# Patient Record
Sex: MALE | Race: WHITE | Employment: OTHER | URBAN - METROPOLITAN AREA
[De-identification: names, ages, dates, MRNs, and addresses within clinical notes are randomized per-mention and may not be internally consistent; named-entity substitution may affect disease eponyms.]

---

## 2022-05-19 ENCOUNTER — HOSPITAL ENCOUNTER (INPATIENT)
Age: 77
LOS: 4 days | Discharge: HOME | DRG: 377 | End: 2022-05-24
Attending: STUDENT IN AN ORGANIZED HEALTH CARE EDUCATION/TRAINING PROGRAM | Admitting: FAMILY MEDICINE
Payer: MEDICARE

## 2022-05-19 DIAGNOSIS — K92.2 LOWER GI BLEED: Primary | ICD-10-CM

## 2022-05-19 PROCEDURE — 99285 EMERGENCY DEPT VISIT HI MDM: CPT

## 2022-05-19 RX ORDER — ATORVASTATIN CALCIUM 80 MG/1
40 TABLET, FILM COATED ORAL DAILY
COMMUNITY

## 2022-05-19 NOTE — Clinical Note
Status[de-identified] INPATIENT [101]   Type of Bed: Telemetry [19]   Cardiac Monitoring Required?: Yes   Inpatient Hospitalization Certified Necessary for the Following Reasons: 3.  Patient receiving treatment that can only be provided in an inpatient setting (further clarification in H&P documentation)   Admitting Diagnosis: Upper GI bleed [432134]   Admitting Physician: Zuleika Median [[id number]]   Attending Physician: Zuleika Median [[id number]]   Estimated Length of Stay: 2 Midnights   Discharge Plan[de-identified] Home with Office Follow-up

## 2022-05-20 ENCOUNTER — APPOINTMENT (OUTPATIENT)
Dept: CT IMAGING | Age: 77
DRG: 377 | End: 2022-05-20
Attending: INTERNAL MEDICINE
Payer: MEDICARE

## 2022-05-20 PROBLEM — K92.2 UPPER GI BLEED: Status: ACTIVE | Noted: 2022-05-20

## 2022-05-20 PROBLEM — K92.2 GI BLEED: Status: ACTIVE | Noted: 2022-05-20

## 2022-05-20 LAB
ALBUMIN SERPL-MCNC: 3.1 G/DL (ref 3.5–5)
ALBUMIN/GLOB SERPL: 1 {RATIO} (ref 1.1–2.2)
ALP SERPL-CCNC: 85 U/L (ref 45–117)
ALT SERPL-CCNC: 27 U/L (ref 12–78)
ANION GAP SERPL CALC-SCNC: 8 MMOL/L (ref 5–15)
APTT PPP: 21.8 SEC (ref 22.1–31)
AST SERPL W P-5'-P-CCNC: 14 U/L (ref 15–37)
BASOPHILS # BLD: 0 K/UL (ref 0–0.1)
BASOPHILS # BLD: 0.1 K/UL (ref 0–0.1)
BASOPHILS NFR BLD: 0 % (ref 0–1)
BASOPHILS NFR BLD: 0 % (ref 0–1)
BILIRUB SERPL-MCNC: 0.3 MG/DL (ref 0.2–1)
BUN SERPL-MCNC: 42 MG/DL (ref 6–20)
BUN/CREAT SERPL: 38 (ref 12–20)
CA-I BLD-MCNC: 8.4 MG/DL (ref 8.5–10.1)
CHLORIDE SERPL-SCNC: 104 MMOL/L (ref 97–108)
CO2 SERPL-SCNC: 26 MMOL/L (ref 21–32)
CREAT SERPL-MCNC: 1.1 MG/DL (ref 0.7–1.3)
DIFFERENTIAL METHOD BLD: ABNORMAL
DIFFERENTIAL METHOD BLD: ABNORMAL
EOSINOPHIL # BLD: 0.1 K/UL (ref 0–0.4)
EOSINOPHIL # BLD: 0.1 K/UL (ref 0–0.4)
EOSINOPHIL NFR BLD: 1 % (ref 0–7)
EOSINOPHIL NFR BLD: 1 % (ref 0–7)
ERYTHROCYTE [DISTWIDTH] IN BLOOD BY AUTOMATED COUNT: 12.2 % (ref 11.5–14.5)
ERYTHROCYTE [DISTWIDTH] IN BLOOD BY AUTOMATED COUNT: 12.2 % (ref 11.5–14.5)
GLOBULIN SER CALC-MCNC: 3.2 G/DL (ref 2–4)
GLUCOSE SERPL-MCNC: 152 MG/DL (ref 65–100)
HCT VFR BLD AUTO: 23.7 % (ref 36.6–50.3)
HCT VFR BLD AUTO: 31.9 % (ref 36.6–50.3)
HEMOCCULT SP1 STL QL: POSITIVE
HGB BLD-MCNC: 10.6 G/DL (ref 12.1–17)
HGB BLD-MCNC: 8 G/DL (ref 12.1–17)
IMM GRANULOCYTES # BLD AUTO: 0 K/UL (ref 0–0.04)
IMM GRANULOCYTES # BLD AUTO: 0 K/UL (ref 0–0.04)
IMM GRANULOCYTES NFR BLD AUTO: 0 % (ref 0–0.5)
IMM GRANULOCYTES NFR BLD AUTO: 0 % (ref 0–0.5)
INR PPP: 1 (ref 0.9–1.1)
LYMPHOCYTES # BLD: 3 K/UL (ref 0.8–3.5)
LYMPHOCYTES # BLD: 4.2 K/UL (ref 0.8–3.5)
LYMPHOCYTES NFR BLD: 32 % (ref 12–49)
LYMPHOCYTES NFR BLD: 33 % (ref 12–49)
MCH RBC QN AUTO: 32.6 PG (ref 26–34)
MCH RBC QN AUTO: 32.8 PG (ref 26–34)
MCHC RBC AUTO-ENTMCNC: 33.2 G/DL (ref 30–36.5)
MCHC RBC AUTO-ENTMCNC: 33.8 G/DL (ref 30–36.5)
MCV RBC AUTO: 97.1 FL (ref 80–99)
MCV RBC AUTO: 98.2 FL (ref 80–99)
MONOCYTES # BLD: 0.7 K/UL (ref 0–1)
MONOCYTES # BLD: 0.8 K/UL (ref 0–1)
MONOCYTES NFR BLD: 6 % (ref 5–13)
MONOCYTES NFR BLD: 8 % (ref 5–13)
NEUTS SEG # BLD: 5.4 K/UL (ref 1.8–8)
NEUTS SEG # BLD: 7.6 K/UL (ref 1.8–8)
NEUTS SEG NFR BLD: 59 % (ref 32–75)
NEUTS SEG NFR BLD: 60 % (ref 32–75)
PLATELET # BLD AUTO: 211 K/UL (ref 150–400)
PLATELET # BLD AUTO: 273 K/UL (ref 150–400)
PMV BLD AUTO: 9.7 FL (ref 8.9–12.9)
PMV BLD AUTO: 9.9 FL (ref 8.9–12.9)
POTASSIUM SERPL-SCNC: 4.3 MMOL/L (ref 3.5–5.1)
PROT SERPL-MCNC: 6.3 G/DL (ref 6.4–8.2)
PROTHROMBIN TIME: 9.9 SEC (ref 9–11.1)
RBC # BLD AUTO: 2.44 M/UL (ref 4.1–5.7)
RBC # BLD AUTO: 3.25 M/UL (ref 4.1–5.7)
SODIUM SERPL-SCNC: 138 MMOL/L (ref 136–145)
THERAPEUTIC RANGE,PTTT: ABNORMAL SEC (ref 82–109)
TROPONIN-HIGH SENSITIVITY: 6 NG/L (ref 0–76)
WBC # BLD AUTO: 12.8 K/UL (ref 4.1–11.1)
WBC # BLD AUTO: 9.3 K/UL (ref 4.1–11.1)

## 2022-05-20 PROCEDURE — 74011000250 HC RX REV CODE- 250: Performed by: FAMILY MEDICINE

## 2022-05-20 PROCEDURE — 85730 THROMBOPLASTIN TIME PARTIAL: CPT

## 2022-05-20 PROCEDURE — C9113 INJ PANTOPRAZOLE SODIUM, VIA: HCPCS | Performed by: NURSE PRACTITIONER

## 2022-05-20 PROCEDURE — C9113 INJ PANTOPRAZOLE SODIUM, VIA: HCPCS | Performed by: STUDENT IN AN ORGANIZED HEALTH CARE EDUCATION/TRAINING PROGRAM

## 2022-05-20 PROCEDURE — 74011250636 HC RX REV CODE- 250/636: Performed by: NURSE PRACTITIONER

## 2022-05-20 PROCEDURE — 74011250636 HC RX REV CODE- 250/636: Performed by: FAMILY MEDICINE

## 2022-05-20 PROCEDURE — 85025 COMPLETE CBC W/AUTO DIFF WBC: CPT

## 2022-05-20 PROCEDURE — 86923 COMPATIBILITY TEST ELECTRIC: CPT

## 2022-05-20 PROCEDURE — 74011000636 HC RX REV CODE- 636: Performed by: FAMILY MEDICINE

## 2022-05-20 PROCEDURE — 74011000250 HC RX REV CODE- 250: Performed by: STUDENT IN AN ORGANIZED HEALTH CARE EDUCATION/TRAINING PROGRAM

## 2022-05-20 PROCEDURE — 96374 THER/PROPH/DIAG INJ IV PUSH: CPT

## 2022-05-20 PROCEDURE — C9113 INJ PANTOPRAZOLE SODIUM, VIA: HCPCS | Performed by: FAMILY MEDICINE

## 2022-05-20 PROCEDURE — 36415 COLL VENOUS BLD VENIPUNCTURE: CPT

## 2022-05-20 PROCEDURE — 86900 BLOOD TYPING SEROLOGIC ABO: CPT

## 2022-05-20 PROCEDURE — 65270000029 HC RM PRIVATE

## 2022-05-20 PROCEDURE — 74011000250 HC RX REV CODE- 250: Performed by: NURSE PRACTITIONER

## 2022-05-20 PROCEDURE — 93005 ELECTROCARDIOGRAM TRACING: CPT

## 2022-05-20 PROCEDURE — 85610 PROTHROMBIN TIME: CPT

## 2022-05-20 PROCEDURE — 80053 COMPREHEN METABOLIC PANEL: CPT

## 2022-05-20 PROCEDURE — 74011250637 HC RX REV CODE- 250/637: Performed by: STUDENT IN AN ORGANIZED HEALTH CARE EDUCATION/TRAINING PROGRAM

## 2022-05-20 PROCEDURE — 84484 ASSAY OF TROPONIN QUANT: CPT

## 2022-05-20 PROCEDURE — 82272 OCCULT BLD FECES 1-3 TESTS: CPT

## 2022-05-20 PROCEDURE — 74174 CTA ABD&PLVS W/CONTRAST: CPT

## 2022-05-20 PROCEDURE — 74011250636 HC RX REV CODE- 250/636: Performed by: STUDENT IN AN ORGANIZED HEALTH CARE EDUCATION/TRAINING PROGRAM

## 2022-05-20 RX ORDER — ONDANSETRON 4 MG/1
4 TABLET, ORALLY DISINTEGRATING ORAL
Status: DISCONTINUED | OUTPATIENT
Start: 2022-05-20 | End: 2022-05-24 | Stop reason: HOSPADM

## 2022-05-20 RX ORDER — SODIUM CHLORIDE 9 MG/ML
75 INJECTION, SOLUTION INTRAVENOUS CONTINUOUS
Status: DISCONTINUED | OUTPATIENT
Start: 2022-05-20 | End: 2022-05-24 | Stop reason: HOSPADM

## 2022-05-20 RX ORDER — SODIUM CHLORIDE 9 MG/ML
50 INJECTION, SOLUTION INTRAVENOUS CONTINUOUS
Status: DISCONTINUED | OUTPATIENT
Start: 2022-05-20 | End: 2022-05-20

## 2022-05-20 RX ORDER — SODIUM CHLORIDE 0.9 % (FLUSH) 0.9 %
5-40 SYRINGE (ML) INJECTION EVERY 8 HOURS
Status: DISCONTINUED | OUTPATIENT
Start: 2022-05-20 | End: 2022-05-24 | Stop reason: HOSPADM

## 2022-05-20 RX ORDER — ACETAMINOPHEN 650 MG/1
650 SUPPOSITORY RECTAL
Status: DISCONTINUED | OUTPATIENT
Start: 2022-05-20 | End: 2022-05-24 | Stop reason: HOSPADM

## 2022-05-20 RX ORDER — ONDANSETRON 2 MG/ML
4 INJECTION INTRAMUSCULAR; INTRAVENOUS
Status: DISCONTINUED | OUTPATIENT
Start: 2022-05-20 | End: 2022-05-24 | Stop reason: HOSPADM

## 2022-05-20 RX ORDER — ACETAMINOPHEN 325 MG/1
650 TABLET ORAL
Status: DISCONTINUED | OUTPATIENT
Start: 2022-05-20 | End: 2022-05-24 | Stop reason: HOSPADM

## 2022-05-20 RX ORDER — ACETAMINOPHEN 325 MG/1
975 TABLET ORAL
Status: COMPLETED | OUTPATIENT
Start: 2022-05-20 | End: 2022-05-20

## 2022-05-20 RX ORDER — ATORVASTATIN CALCIUM 40 MG/1
40 TABLET, FILM COATED ORAL DAILY
Status: DISCONTINUED | OUTPATIENT
Start: 2022-05-20 | End: 2022-05-24 | Stop reason: HOSPADM

## 2022-05-20 RX ORDER — SODIUM CHLORIDE 0.9 % (FLUSH) 0.9 %
5-40 SYRINGE (ML) INJECTION AS NEEDED
Status: DISCONTINUED | OUTPATIENT
Start: 2022-05-20 | End: 2022-05-24 | Stop reason: HOSPADM

## 2022-05-20 RX ORDER — POLYETHYLENE GLYCOL 3350 17 G/17G
17 POWDER, FOR SOLUTION ORAL DAILY PRN
Status: DISCONTINUED | OUTPATIENT
Start: 2022-05-20 | End: 2022-05-24 | Stop reason: HOSPADM

## 2022-05-20 RX ADMIN — ACETAMINOPHEN 975 MG: 325 TABLET ORAL at 02:06

## 2022-05-20 RX ADMIN — SODIUM CHLORIDE, PRESERVATIVE FREE 40 MG: 5 INJECTION INTRAVENOUS at 06:42

## 2022-05-20 RX ADMIN — SODIUM CHLORIDE 1000 ML: 9 INJECTION, SOLUTION INTRAVENOUS at 00:20

## 2022-05-20 RX ADMIN — SODIUM CHLORIDE, PRESERVATIVE FREE 40 MG: 5 INJECTION INTRAVENOUS at 20:00

## 2022-05-20 RX ADMIN — SODIUM CHLORIDE, PRESERVATIVE FREE 10 ML: 5 INJECTION INTRAVENOUS at 19:25

## 2022-05-20 RX ADMIN — SODIUM CHLORIDE 75 ML/HR: 9 INJECTION, SOLUTION INTRAVENOUS at 09:53

## 2022-05-20 RX ADMIN — IOPAMIDOL 100 ML: 755 INJECTION, SOLUTION INTRAVENOUS at 15:10

## 2022-05-20 RX ADMIN — SODIUM CHLORIDE, PRESERVATIVE FREE 40 MG: 5 INJECTION INTRAVENOUS at 09:54

## 2022-05-20 NOTE — H&P
History and Physical    NAME: Arslan Bauer   :  1945   MRN:  [id number]     Date/Time:  2022 8:04 AM    Patient PCP: Axel Sanchez MD  ______________________________________________________________________         Subjective:     CHIEF COMPLAINT: GI bleed      HISTORY OF PRESENT ILLNESS:       Patient is a 78y.o. year old male with a PMHx significant for COPD, hypercholesteremia, tobacco use, prior lower GI bleed 20 years ago, who presented to the ED for rectal bleeding for the past day. Patient states that around 2pm yesterday he began having dark tarry stools which transitioned to bright red stools, he had 7 episodes of bloody stools yesterday. He has associated mid epigastric pain that is worse with movement and chills Per wife, patient had a similar episode of GI bleeding 20 years ago and had part of his colon resected. Patient denies any sick contacts, new medication changes, new foods or recent travel. He denies fever, chest pain, SOB, nausea, vomiting, headache. He is a current tobacco smoker and reports smoking 3 cigarettes daily. ER workup showed initial Hgb was 10.6 which dropped down to 8.0 after six hours. Patient was seen by the ER physician and admitted for further evaluation and treatment. Past Medical History:   Diagnosis Date    Asbestos exposure     Chronic obstructive pulmonary disease (HCC)     GI bleed     High cholesterol         History reviewed. No pertinent surgical history. Social History     Tobacco Use    Smoking status: Current Every Day Smoker     Packs/day: 0.25    Smokeless tobacco: Never Used   Substance Use Topics    Alcohol use: Not Currently     Comment: 16 years without        History reviewed. No pertinent family history. No Known Allergies     Prior to Admission medications    Medication Sig Start Date End Date Taking? Authorizing Provider   atorvastatin (LIPITOR) 80 mg tablet Take 40 mg by mouth daily.    Yes Laura, MD Axel         Current Facility-Administered Medications:     atorvastatin (LIPITOR) tablet 40 mg, 40 mg, Oral, DAILY, Nelly Johansen MD    0.9% sodium chloride infusion, 75 mL/hr, IntraVENous, CONTINUOUS, Nelly Johansen MD    acetaminophen (TYLENOL) tablet 650 mg, 650 mg, Oral, Q6H PRN **OR** acetaminophen (TYLENOL) suppository 650 mg, 650 mg, Rectal, Q6H PRN, Nelly Johansen MD    polyethylene glycol (MIRALAX) packet 17 g, 17 g, Oral, DAILY PRN, Nelly Johansen MD    ondansetron (ZOFRAN ODT) tablet 4 mg, 4 mg, Oral, Q8H PRN **OR** ondansetron (ZOFRAN) injection 4 mg, 4 mg, IntraVENous, Q6H PRN, Jena Johansen MD    pantoprazole (PROTONIX) 40 mg in 0.9% sodium chloride 10 mL injection, 40 mg, IntraVENous, DAILY, Jnea Johansen MD    Current Outpatient Medications:     atorvastatin (LIPITOR) 80 mg tablet, Take 40 mg by mouth daily. , Disp: , Rfl:     LAB DATA REVIEWED:    Recent Results (from the past 24 hour(s))   CBC WITH AUTOMATED DIFF    Collection Time: 05/20/22 12:15 AM   Result Value Ref Range    WBC 12.8 (H) 4.1 - 11.1 K/uL    RBC 3.25 (L) 4.10 - 5.70 M/uL    HGB 10.6 (L) 12.1 - 17.0 g/dL    HCT 31.9 (L) 36.6 - 50.3 %    MCV 98.2 80.0 - 99.0 FL    MCH 32.6 26.0 - 34.0 PG    MCHC 33.2 30.0 - 36.5 g/dL    RDW 12.2 11.5 - 14.5 %    PLATELET 710 425 - 234 K/uL    MPV 9.7 8.9 - 12.9 FL    NEUTROPHILS 60 32 - 75 %    LYMPHOCYTES 33 12 - 49 %    MONOCYTES 6 5 - 13 %    EOSINOPHILS 1 0 - 7 %    BASOPHILS 0 0 - 1 %    IMMATURE GRANULOCYTES 0 0.0 - 0.5 %    ABS. NEUTROPHILS 7.6 1.8 - 8.0 K/UL    ABS. LYMPHOCYTES 4.2 (H) 0.8 - 3.5 K/UL    ABS. MONOCYTES 0.8 0.0 - 1.0 K/UL    ABS. EOSINOPHILS 0.1 0.0 - 0.4 K/UL    ABS. BASOPHILS 0.1 0.0 - 0.1 K/UL    ABS. IMM.  GRANS. 0.0 0.00 - 0.04 K/UL    DF AUTOMATED     METABOLIC PANEL, COMPREHENSIVE    Collection Time: 05/20/22 12:15 AM   Result Value Ref Range    Sodium 138 136 - 145 mmol/L    Potassium 4.3 3.5 - 5.1 mmol/L    Chloride 104 97 - 108 mmol/L    CO2 26 21 - 32 mmol/L Anion gap 8 5 - 15 mmol/L    Glucose 152 (H) 65 - 100 mg/dL    BUN 42 (H) 6 - 20 mg/dL    Creatinine 1.10 0.70 - 1.30 mg/dL    BUN/Creatinine ratio 38 (H) 12 - 20      GFR est AA >60 >60 ml/min/1.73m2    GFR est non-AA >60 >60 ml/min/1.73m2    Calcium 8.4 (L) 8.5 - 10.1 mg/dL    Bilirubin, total 0.3 0.2 - 1.0 mg/dL    AST (SGOT) 14 (L) 15 - 37 U/L    ALT (SGPT) 27 12 - 78 U/L    Alk. phosphatase 85 45 - 117 U/L    Protein, total 6.3 (L) 6.4 - 8.2 g/dL    Albumin 3.1 (L) 3.5 - 5.0 g/dL    Globulin 3.2 2.0 - 4.0 g/dL    A-G Ratio 1.0 (L) 1.1 - 2.2     PROTHROMBIN TIME + INR    Collection Time: 05/20/22 12:15 AM   Result Value Ref Range    Prothrombin time 9.9 9.0 - 11.1 sec    INR 1.0 0.9 - 1.1     PTT    Collection Time: 05/20/22 12:15 AM   Result Value Ref Range    aPTT 21.8 (L) 22.1 - 31.0 sec    aPTT, therapeutic range   82 - 109 sec   OCCULT BLOOD, STOOL    Collection Time: 05/20/22 12:15 AM   Result Value Ref Range    Occult Blood,day 1 Positive (A) Negative     TYPE & SCREEN    Collection Time: 05/20/22 12:15 AM   Result Value Ref Range    Crossmatch Expiration 05/23/2022,2359     ABO/Rh(D) Ben Neisha Negative     Antibody screen Negative    TROPONIN-HIGH SENSITIVITY    Collection Time: 05/20/22 12:30 AM   Result Value Ref Range    Troponin-High Sensitivity 6 0 - 76 ng/L   CBC WITH AUTOMATED DIFF    Collection Time: 05/20/22  6:04 AM   Result Value Ref Range    WBC 9.3 4.1 - 11.1 K/uL    RBC 2.44 (L) 4.10 - 5.70 M/uL    HGB 8.0 (L) 12.1 - 17.0 g/dL    HCT 23.7 (L) 36.6 - 50.3 %    MCV 97.1 80.0 - 99.0 FL    MCH 32.8 26.0 - 34.0 PG    MCHC 33.8 30.0 - 36.5 g/dL    RDW 12.2 11.5 - 14.5 %    PLATELET 076 513 - 985 K/uL    MPV 9.9 8.9 - 12.9 FL    NEUTROPHILS 59 32 - 75 %    LYMPHOCYTES 32 12 - 49 %    MONOCYTES 8 5 - 13 %    EOSINOPHILS 1 0 - 7 %    BASOPHILS 0 0 - 1 %    IMMATURE GRANULOCYTES 0 0.0 - 0.5 %    ABS. NEUTROPHILS 5.4 1.8 - 8.0 K/UL    ABS. LYMPHOCYTES 3.0 0.8 - 3.5 K/UL    ABS.  MONOCYTES 0.7 0.0 - 1.0 K/UL ABS. EOSINOPHILS 0.1 0.0 - 0.4 K/UL    ABS. BASOPHILS 0.0 0.0 - 0.1 K/UL    ABS. IMM. GRANS. 0.0 0.00 - 0.04 K/UL    DF AUTOMATED         XR Results (most recent):  No results found for this or any previous visit. No orders to display        Review of Systems:  Constitutional: +chills Negative for fever. HENT: Negative. Eyes: Negative. Respiratory: Negative. Cardiovascular: Negative. Gastrointestinal: +abdominal pain, +melena, +hematochezia Negative for nausea, vomiting  Skin: Negative. Neurological: Negative. Objective:   VITALS:    Visit Vitals  BP 90/63 (BP 1 Location: Left upper arm)   Pulse 91   Temp 98.1 °F (36.7 °C)   Resp 17   Ht 5' 10\" (1.778 m)   Wt 137 lb (62.1 kg)   SpO2 98%   BMI 19.66 kg/m²       Physical Exam:   Constitutional: pt is oriented to person, place, and time. HENT:   Head: Normocephalic and atraumatic. Eyes: Pupils are equal, round, and reactive to light. EOM are normal.   Cardiovascular: Normal rate, regular rhythm and normal heart sounds. Pulmonary/Chest: Breath sounds normal. No wheezes. No rales. Exhibits no tenderness. Abdominal: Soft. Bowel sounds are normal. There is epigastric abdominal tenderness. There is no rebound and no guarding. Musculoskeletal: Normal range of motion. Neurological: pt is alert and oriented to person, place, and time. Alert. Normal strength. No cranial nerve deficit or sensory deficit. Displays a negative Romberg sign.         ASSESSMENT:  GI bleed  COPD  Tobacco use    PLAN:    Current Facility-Administered Medications:     atorvastatin (LIPITOR) tablet 40 mg, 40 mg, Oral, DAILY, Paulina Johansen MD    0.9% sodium chloride infusion, 75 mL/hr, IntraVENous, CONTINUOUS, Paulina Johansen MD    acetaminophen (TYLENOL) tablet 650 mg, 650 mg, Oral, Q6H PRN **OR** acetaminophen (TYLENOL) suppository 650 mg, 650 mg, Rectal, Q6H PRN, Jena Johansen MD    polyethylene glycol (MIRALAX) packet 17 g, 17 g, Oral, DAILY PRN, Aye Johansen MD    ondansetron (ZOFRAN ODT) tablet 4 mg, 4 mg, Oral, Q8H PRN **OR** ondansetron (ZOFRAN) injection 4 mg, 4 mg, IntraVENous, Q6H PRN, Aye Johansen MD    pantoprazole (PROTONIX) 40 mg in 0.9% sodium chloride 10 mL injection, 40 mg, IntraVENous, DAILY, Jena Johansen MD    Current Outpatient Medications:     atorvastatin (LIPITOR) 80 mg tablet, Take 40 mg by mouth daily. , Disp: , Rfl:       GI consult  CT abdomen/pelvis  ________________________________________________________________________    Signed: Christin Chapa

## 2022-05-20 NOTE — ED PROVIDER NOTES
EMERGENCY DEPARTMENT HISTORY AND PHYSICAL EXAM      Date: 5/19/2022  Patient Name: Dallas Rodriguez    History of Presenting Illness     Chief Complaint   Patient presents with    Rectal Bleeding       History Provided By: Patient    HPI: Dallas Rodriguez, 78 y.o. male with a past medical history significant COPD presents to the ED with cc of rectal bleeding. Patient states over the course of the day has noticed black stool which transitioned to bright red stool. Patient states he has had multiple bowel movements since this afternoon. Complaining of some mild midepigastric pain, no nausea or vomiting, no chest pain is complaining of some worsening shortness of breath on exertion. Patient states that approximately 20 years ago had episode of lower GI bleed for which she had to have part of his colon removed. Patient is not on any blood thinners at this time. There are no other complaints, changes, or physical findings at this time. PCP: Axel Sanchez MD    Current Facility-Administered Medications   Medication Dose Route Frequency Provider Last Rate Last Admin    pantoprazole (PROTONIX) 40 mg in 0.9% sodium chloride 10 mL injection  40 mg IntraVENous ONCE Caleb Loving MD         Current Outpatient Medications   Medication Sig Dispense Refill    atorvastatin (LIPITOR) 80 mg tablet Take 40 mg by mouth daily. Past History     Past Medical History:  Past Medical History:   Diagnosis Date    Asbestos exposure     Chronic obstructive pulmonary disease ([address]     GI bleed     High cholesterol        Past Surgical History:  History reviewed. No pertinent surgical history. Family History:  History reviewed. No pertinent family history.     Social History:  Social History     Tobacco Use    Smoking status: Current Every Day Smoker     Packs/day: 0.25    Smokeless tobacco: Never Used   Substance Use Topics    Alcohol use: Not Currently     Comment: 16 years without    Drug use: Yes     Types: Marijuana       Allergies:  No Known Allergies      Review of Systems     Review of Systems   Constitutional: Negative for activity change, appetite change, chills and fever. HENT: Negative for congestion, sore throat and trouble swallowing. Eyes: Negative for photophobia and visual disturbance. Respiratory: Negative for cough, chest tightness and shortness of breath. Cardiovascular: Negative for chest pain and palpitations. Gastrointestinal: Positive for blood in stool. Negative for abdominal pain and nausea. Genitourinary: Negative for dysuria and flank pain. Musculoskeletal: Negative for arthralgias and neck pain. Skin: Negative for color change and pallor. Neurological: Negative for dizziness, weakness, numbness and headaches. Physical Exam     Physical Exam  Vitals and nursing note reviewed. Constitutional:       General: He is in acute distress. Appearance: He is well-developed and normal weight. He is not ill-appearing or toxic-appearing. HENT:      Head: Normocephalic and atraumatic. Mouth/Throat:      Mouth: Mucous membranes are moist.   Eyes:      General: No scleral icterus. Extraocular Movements: Extraocular movements intact. Cardiovascular:      Rate and Rhythm: Normal rate and regular rhythm. Heart sounds: Normal heart sounds. Pulmonary:      Effort: Pulmonary effort is normal.   Abdominal:      General: Abdomen is flat. Bowel sounds are normal. There is no distension. Palpations: Abdomen is soft. Tenderness: There is abdominal tenderness in the epigastric area. There is no right CVA tenderness or left CVA tenderness. Genitourinary:     Comments: No hemorrhoids, maroon-colored stool noted  Skin:     General: Skin is warm and dry. Capillary Refill: Capillary refill takes less than 2 seconds. Neurological:      General: No focal deficit present. Mental Status: He is alert and oriented to person, place, and time. Diagnostic Study Results     Labs -     Recent Results (from the past 12 hour(s))   CBC WITH AUTOMATED DIFF    Collection Time: 05/20/22 12:15 AM   Result Value Ref Range    WBC 12.8 (H) 4.1 - 11.1 K/uL    RBC 3.25 (L) 4.10 - 5.70 M/uL    HGB 10.6 (L) 12.1 - 17.0 g/dL    HCT 31.9 (L) 36.6 - 50.3 %    MCV 98.2 80.0 - 99.0 FL    MCH 32.6 26.0 - 34.0 PG    MCHC 33.2 30.0 - 36.5 g/dL    RDW 12.2 11.5 - 14.5 %    PLATELET 696 107 - 650 K/uL    MPV 9.7 8.9 - 12.9 FL    NEUTROPHILS 60 32 - 75 %    LYMPHOCYTES 33 12 - 49 %    MONOCYTES 6 5 - 13 %    EOSINOPHILS 1 0 - 7 %    BASOPHILS 0 0 - 1 %    IMMATURE GRANULOCYTES 0 0.0 - 0.5 %    ABS. NEUTROPHILS 7.6 1.8 - 8.0 K/UL    ABS. LYMPHOCYTES 4.2 (H) 0.8 - 3.5 K/UL    ABS. MONOCYTES 0.8 0.0 - 1.0 K/UL    ABS. EOSINOPHILS 0.1 0.0 - 0.4 K/UL    ABS. BASOPHILS 0.1 0.0 - 0.1 K/UL    ABS. IMM. GRANS. 0.0 0.00 - 0.04 K/UL    DF AUTOMATED     METABOLIC PANEL, COMPREHENSIVE    Collection Time: 05/20/22 12:15 AM   Result Value Ref Range    Sodium 138 136 - 145 mmol/L    Potassium 4.3 3.5 - 5.1 mmol/L    Chloride 104 97 - 108 mmol/L    CO2 26 21 - 32 mmol/L    Anion gap 8 5 - 15 mmol/L    Glucose 152 (H) 65 - 100 mg/dL    BUN 42 (H) 6 - 20 mg/dL    Creatinine 1.10 0.70 - 1.30 mg/dL    BUN/Creatinine ratio 38 (H) 12 - 20      GFR est AA >60 >60 ml/min/1.73m2    GFR est non-AA >60 >60 ml/min/1.73m2    Calcium 8.4 (L) 8.5 - 10.1 mg/dL    Bilirubin, total 0.3 0.2 - 1.0 mg/dL    AST (SGOT) 14 (L) 15 - 37 U/L    ALT (SGPT) 27 12 - 78 U/L    Alk.  phosphatase 85 45 - 117 U/L    Protein, total 6.3 (L) 6.4 - 8.2 g/dL    Albumin 3.1 (L) 3.5 - 5.0 g/dL    Globulin 3.2 2.0 - 4.0 g/dL    A-G Ratio 1.0 (L) 1.1 - 2.2     PROTHROMBIN TIME + INR    Collection Time: 05/20/22 12:15 AM   Result Value Ref Range    Prothrombin time 9.9 9.0 - 11.1 sec    INR 1.0 0.9 - 1.1     PTT    Collection Time: 05/20/22 12:15 AM   Result Value Ref Range    aPTT 21.8 (L) 22.1 - 31.0 sec    aPTT, therapeutic range 82 - 109 sec   OCCULT BLOOD, STOOL    Collection Time: 05/20/22 12:15 AM   Result Value Ref Range    Occult Blood,day 1 Positive (A) Negative     TYPE & SCREEN    Collection Time: 05/20/22 12:15 AM   Result Value Ref Range    Crossmatch Expiration 05/23/2022,2359     ABO/Rh(D) Yesica Cota Negative     Antibody screen Negative    TROPONIN-HIGH SENSITIVITY    Collection Time: 05/20/22 12:30 AM   Result Value Ref Range    Troponin-High Sensitivity 6 0 - 76 ng/L   CBC WITH AUTOMATED DIFF    Collection Time: 05/20/22  6:04 AM   Result Value Ref Range    WBC 9.3 4.1 - 11.1 K/uL    RBC 2.44 (L) 4.10 - 5.70 M/uL    HGB 8.0 (L) 12.1 - 17.0 g/dL    HCT 23.7 (L) 36.6 - 50.3 %    MCV 97.1 80.0 - 99.0 FL    MCH 32.8 26.0 - 34.0 PG    MCHC 33.8 30.0 - 36.5 g/dL    RDW 12.2 11.5 - 14.5 %    PLATELET 247 497 - 501 K/uL    MPV 9.9 8.9 - 12.9 FL    NEUTROPHILS 59 32 - 75 %    LYMPHOCYTES 32 12 - 49 %    MONOCYTES 8 5 - 13 %    EOSINOPHILS 1 0 - 7 %    BASOPHILS 0 0 - 1 %    IMMATURE GRANULOCYTES 0 0.0 - 0.5 %    ABS. NEUTROPHILS 5.4 1.8 - 8.0 K/UL    ABS. LYMPHOCYTES 3.0 0.8 - 3.5 K/UL    ABS. MONOCYTES 0.7 0.0 - 1.0 K/UL    ABS. EOSINOPHILS 0.1 0.0 - 0.4 K/UL    ABS. BASOPHILS 0.0 0.0 - 0.1 K/UL    ABS. IMM. GRANS. 0.0 0.00 - 0.04 K/UL    DF AUTOMATED         Radiologic Studies -   [unfilled]  CT Results  (Last 48 hours)    None        CXR Results  (Last 48 hours)    None          Medical Decision Making and ED Course   I am the first provider for this patient. I reviewed the vital signs, available nursing notes, past medical history, past surgical history, family history and social history. Vital Signs-Reviewed the patient's vital signs.   Patient Vitals for the past 12 hrs:   Temp Pulse Resp BP SpO2   05/20/22 0626 -- -- -- -- 98 %   05/20/22 0452 98.1 °F (36.7 °C) 91 17 90/63 98 %   05/20/22 0206 -- 92 18 (!) 93/59 97 %   05/20/22 0028 -- (!) 108 22 99/66 99 %   05/19/22 2343 97.5 °F (36.4 °C) 98 17 90/60 98 %       EKG interpretation: (Preliminary)  Normal sinus rhythm 92, no ST elevation, normal axis    Records Reviewed: Nursing Notes    The patient presents with bright red blood per rectum with a differential diagnosis of lower GI bleed brisk upper GI bleed, hemorrhoid, AVM, diverticulitis      Provider Notes (Medical Decision Making):     MDM   81-year-old male, history of COPD, distant history of lower GI bleed presents emergency department for multiple episodes of dark stool transitioning to bloody stool over the course of the day. Physical exam shows mild tachycardia 100-1 05, borderline blood pressure 90 systolic over 60, abdomen soft mild epigastric tenderness. Rectal exam shows bright red blood per rectum no active extravasation at this time. 2 large-bore IVs established, basic lab work including type and screen and coags ordered, 1 L normal saline ordered. ED Course:   Initial assessment performed. The patients presenting problems have been discussed, and they are in agreement with the care plan formulated and outlined with them. I have encouraged them to ask questions as they arise throughout their visit. ED Course as of 05/20/22 0636   Fri May 20, 2022   0132 Patient's lab work resulted, CBC shows appropriate hemoglobin 10.6 hematocrit 61.2, metabolic panel grossly normal mild hyperglycemia. 152, normal coags, blood pressure systolic 08-26, pulse rate 98-1 08. Patient received 1 L normal saline. No active extravasation at this time. Will admit patient to telemetry bed for GI bleed. At this time patient stable for freestanding however if there is any change in clinical condition will transfer patient ED to ED. [PZ]   [id number] Patient's repeat CBC shows hemoglobin dropped approximately 2 g every 6 hours. Patient has no active bleeding since has been in the ED, vitals have remained stable since he has been here soft blood pressures 90 systolic.   Given drop in hemoglobin and lack of blood products at freestanding feel patient should be transferred to [facility], will arrange for ED to ED transfer. I discussed case with Dr. Quiana Hines, accepts ED to ED transfer [PZ]   0891 Patient reassessed, states he feels about the same, still complaining of some mid epigastric pain, will order one dose protonix 40mg in case of upper bleed. [PZ]      ED Course User Index  [PZ] Alma Diaz MD         Procedures       Abundio Noriega MD  Procedures           Disposition       Admitted        Diagnosis     Clinical Impression:   1. Lower GI bleed        Attestations:    Abundio Noriega MD    Please note that this dictation was completed with [employer], the computer voice recognition software. Quite often unanticipated grammatical, syntax, homophones, and other interpretive errors are inadvertently transcribed by the computer software. Please disregard these errors. Please excuse any errors that have escaped final proofreading. Thank you.

## 2022-05-20 NOTE — ED TRIAGE NOTES
Pt reports bright red rectal bleeding that started around 1400 today. Pt also reports right upper abdominal pain rated 8/10. Pt reports he has had approx. 7 movements that started as black and has now turned red. Pt reports this has happened to him once before 20 years ago.

## 2022-05-20 NOTE — PROGRESS NOTES
Two person skin assessment performed by Ray Wilkinson RN and Ginger TRAN. Patient's skin is clean, dry and intact. No abnormalities noted at this time.

## 2022-05-20 NOTE — H&P
History and Physical    NAME: Cory Travis   :  1945   MRN:  [id number]     Date/Time:  2022 8:04 AM    Patient PCP: Axel Sanchez MD  ______________________________________________________________________         Subjective:     CHIEF COMPLAINT: GI bleed      HISTORY OF PRESENT ILLNESS:       Patient is a 78y.o. year old male with a PMHx significant for COPD, hypercholesteremia, tobacco use, prior lower GI bleed 20 years ago, who presented to the ED for rectal bleeding for the past day. Patient states that around 2pm yesterday he began having dark tarry stools which transitioned to bright red stools, he had 7 episodes of bloody stools yesterday. He has associated mid epigastric pain that is worse with movement and chills Per wife, patient had a similar episode of GI bleeding 20 years ago and had part of his colon resected. Patient denies any sick contacts, new medication changes, new foods or recent travel. He denies fever, chest pain, SOB, nausea, vomiting, headache. He is a current tobacco smoker and reports smoking 3 cigarettes daily. ER workup showed initial Hgb was 10.6 which dropped down to 8.0 after six hours. Patient was seen by the ER physician and admitted for further evaluation and treatment. Past Medical History:   Diagnosis Date    Asbestos exposure     Chronic obstructive pulmonary disease (HCC)     GI bleed     High cholesterol         History reviewed. No pertinent surgical history. Social History     Tobacco Use    Smoking status: Current Every Day Smoker     Packs/day: 0.25    Smokeless tobacco: Never Used   Substance Use Topics    Alcohol use: Not Currently     Comment: 16 years without        History reviewed. No pertinent family history. No Known Allergies     Prior to Admission medications    Medication Sig Start Date End Date Taking? Authorizing Provider   atorvastatin (LIPITOR) 80 mg tablet Take 40 mg by mouth daily.    Yes Laura, MD Axel         Current Facility-Administered Medications:     atorvastatin (LIPITOR) tablet 40 mg, 40 mg, Oral, DAILY, Sina Johansen MD    0.9% sodium chloride infusion, 75 mL/hr, IntraVENous, CONTINUOUS, Jena Johansen MD, Last Rate: 75 mL/hr at 05/20/22 0953, 75 mL/hr at 05/20/22 0953    acetaminophen (TYLENOL) tablet 650 mg, 650 mg, Oral, Q6H PRN **OR** acetaminophen (TYLENOL) suppository 650 mg, 650 mg, Rectal, Q6H PRN, Sina Johansen MD    polyethylene glycol (MIRALAX) packet 17 g, 17 g, Oral, DAILY PRN, Sina Johansen MD    ondansetron (ZOFRAN ODT) tablet 4 mg, 4 mg, Oral, Q8H PRN **OR** ondansetron (ZOFRAN) injection 4 mg, 4 mg, IntraVENous, Q6H PRN, Jena Johansen MD    pantoprazole (PROTONIX) 40 mg in 0.9% sodium chloride 10 mL injection, 40 mg, IntraVENous, Q12H, Dougherty, Barb F, NP    sodium chloride (NS) flush 5-40 mL, 5-40 mL, IntraVENous, Q8H, Dougherty, Barb F, NP    sodium chloride (NS) flush 5-40 mL, 5-40 mL, IntraVENous, PRN, Barb Vallecillo, NP    [START ON 5/22/2022] peg 3350-electrolytes (COLYTE) 4000 mL, 4,000 mL, Oral, ONCE, Dougherty, Barb F, NP    Current Outpatient Medications:     atorvastatin (LIPITOR) 80 mg tablet, Take 40 mg by mouth daily. , Disp: , Rfl:     LAB DATA REVIEWED:    Recent Results (from the past 24 hour(s))   CBC WITH AUTOMATED DIFF    Collection Time: 05/20/22 12:15 AM   Result Value Ref Range    WBC 12.8 (H) 4.1 - 11.1 K/uL    RBC 3.25 (L) 4.10 - 5.70 M/uL    HGB 10.6 (L) 12.1 - 17.0 g/dL    HCT 31.9 (L) 36.6 - 50.3 %    MCV 98.2 80.0 - 99.0 FL    MCH 32.6 26.0 - 34.0 PG    MCHC 33.2 30.0 - 36.5 g/dL    RDW 12.2 11.5 - 14.5 %    PLATELET 565 556 - 178 K/uL    MPV 9.7 8.9 - 12.9 FL    NEUTROPHILS 60 32 - 75 %    LYMPHOCYTES 33 12 - 49 %    MONOCYTES 6 5 - 13 %    EOSINOPHILS 1 0 - 7 %    BASOPHILS 0 0 - 1 %    IMMATURE GRANULOCYTES 0 0.0 - 0.5 %    ABS. NEUTROPHILS 7.6 1.8 - 8.0 K/UL    ABS. LYMPHOCYTES 4.2 (H) 0.8 - 3.5 K/UL    ABS.  MONOCYTES 0.8 0.0 - 1.0 K/UL    ABS. EOSINOPHILS 0.1 0.0 - 0.4 K/UL    ABS. BASOPHILS 0.1 0.0 - 0.1 K/UL    ABS. IMM. GRANS. 0.0 0.00 - 0.04 K/UL    DF AUTOMATED     METABOLIC PANEL, COMPREHENSIVE    Collection Time: 05/20/22 12:15 AM   Result Value Ref Range    Sodium 138 136 - 145 mmol/L    Potassium 4.3 3.5 - 5.1 mmol/L    Chloride 104 97 - 108 mmol/L    CO2 26 21 - 32 mmol/L    Anion gap 8 5 - 15 mmol/L    Glucose 152 (H) 65 - 100 mg/dL    BUN 42 (H) 6 - 20 mg/dL    Creatinine 1.10 0.70 - 1.30 mg/dL    BUN/Creatinine ratio 38 (H) 12 - 20      GFR est AA >60 >60 ml/min/1.73m2    GFR est non-AA >60 >60 ml/min/1.73m2    Calcium 8.4 (L) 8.5 - 10.1 mg/dL    Bilirubin, total 0.3 0.2 - 1.0 mg/dL    AST (SGOT) 14 (L) 15 - 37 U/L    ALT (SGPT) 27 12 - 78 U/L    Alk.  phosphatase 85 45 - 117 U/L    Protein, total 6.3 (L) 6.4 - 8.2 g/dL    Albumin 3.1 (L) 3.5 - 5.0 g/dL    Globulin 3.2 2.0 - 4.0 g/dL    A-G Ratio 1.0 (L) 1.1 - 2.2     PROTHROMBIN TIME + INR    Collection Time: 05/20/22 12:15 AM   Result Value Ref Range    Prothrombin time 9.9 9.0 - 11.1 sec    INR 1.0 0.9 - 1.1     PTT    Collection Time: 05/20/22 12:15 AM   Result Value Ref Range    aPTT 21.8 (L) 22.1 - 31.0 sec    aPTT, therapeutic range   82 - 109 sec   OCCULT BLOOD, STOOL    Collection Time: 05/20/22 12:15 AM   Result Value Ref Range    Occult Blood,day 1 Positive (A) Negative     TYPE & SCREEN    Collection Time: 05/20/22 12:15 AM   Result Value Ref Range    Crossmatch Expiration 05/23/2022,2359     ABO/Rh(D) Bulmaro Bennett Negative     Antibody screen Negative    TROPONIN-HIGH SENSITIVITY    Collection Time: 05/20/22 12:30 AM   Result Value Ref Range    Troponin-High Sensitivity 6 0 - 76 ng/L   CBC WITH AUTOMATED DIFF    Collection Time: 05/20/22  6:04 AM   Result Value Ref Range    WBC 9.3 4.1 - 11.1 K/uL    RBC 2.44 (L) 4.10 - 5.70 M/uL    HGB 8.0 (L) 12.1 - 17.0 g/dL    HCT 23.7 (L) 36.6 - 50.3 %    MCV 97.1 80.0 - 99.0 FL    MCH 32.8 26.0 - 34.0 PG    MCHC 33.8 30.0 - 36.5 g/dL RDW 12.2 11.5 - 14.5 %    PLATELET 659 334 - 510 K/uL    MPV 9.9 8.9 - 12.9 FL    NEUTROPHILS 59 32 - 75 %    LYMPHOCYTES 32 12 - 49 %    MONOCYTES 8 5 - 13 %    EOSINOPHILS 1 0 - 7 %    BASOPHILS 0 0 - 1 %    IMMATURE GRANULOCYTES 0 0.0 - 0.5 %    ABS. NEUTROPHILS 5.4 1.8 - 8.0 K/UL    ABS. LYMPHOCYTES 3.0 0.8 - 3.5 K/UL    ABS. MONOCYTES 0.7 0.0 - 1.0 K/UL    ABS. EOSINOPHILS 0.1 0.0 - 0.4 K/UL    ABS. BASOPHILS 0.0 0.0 - 0.1 K/UL    ABS. IMM. GRANS. 0.0 0.00 - 0.04 K/UL    DF AUTOMATED         XR Results (most recent):  No results found for this or any previous visit. CTA ABD PELV W WO CONT    (Results Pending)        Review of Systems:  Constitutional: +chills Negative for fever. HENT: Negative. Eyes: Negative. Respiratory: Negative. Cardiovascular: Negative. Gastrointestinal: +abdominal pain, +melena, +hematochezia Negative for nausea, vomiting  Skin: Negative. Neurological: Negative. Objective:   VITALS:    Visit Vitals  /74   Pulse 82   Temp 98.1 °F (36.7 °C)   Resp 16   Ht 5' 10\" (1.778 m)   Wt 62.1 kg (137 lb)   SpO2 96%   BMI 19.66 kg/m²       Physical Exam:   Constitutional: pt is oriented to person, place, and time. HENT:   Head: Normocephalic and atraumatic. Eyes: Pupils are equal, round, and reactive to light. EOM are normal.   Cardiovascular: Normal rate, regular rhythm and normal heart sounds. Pulmonary/Chest: Breath sounds normal. No wheezes. No rales. Exhibits no tenderness. Abdominal: Soft. Bowel sounds are normal. There is epigastric abdominal tenderness. There is no rebound and no guarding. Musculoskeletal: Normal range of motion. Neurological: pt is alert and oriented to person, place, and time. Alert. Normal strength. No cranial nerve deficit or sensory deficit. Displays a negative Romberg sign.         ASSESSMENT:  GI bleed  COPD  Tobacco use  Hyperlipidemia    PLAN:    Current Facility-Administered Medications:     atorvastatin (LIPITOR) tablet 40 mg, 40 mg, Oral, DAILY, Tennille Johansen MD    0.9% sodium chloride infusion, 75 mL/hr, IntraVENous, CONTINUOUS, Jena Johansen MD, Last Rate: 75 mL/hr at 05/20/22 0953, 75 mL/hr at 05/20/22 0953    acetaminophen (TYLENOL) tablet 650 mg, 650 mg, Oral, Q6H PRN **OR** acetaminophen (TYLENOL) suppository 650 mg, 650 mg, Rectal, Q6H PRN, Tennille Johansen MD    polyethylene glycol (MIRALAX) packet 17 g, 17 g, Oral, DAILY PRN, Tennille Johansen MD    ondansetron (ZOFRAN ODT) tablet 4 mg, 4 mg, Oral, Q8H PRN **OR** ondansetron (ZOFRAN) injection 4 mg, 4 mg, IntraVENous, Q6H PRN, Jena Johansen MD    pantoprazole (PROTONIX) 40 mg in 0.9% sodium chloride 10 mL injection, 40 mg, IntraVENous, Q12H, Vazquez Dougherty NP    sodium chloride (NS) flush 5-40 mL, 5-40 mL, IntraVENous, Q8H, Barb Dougherty NP    sodium chloride (NS) flush 5-40 mL, 5-40 mL, IntraVENous, PRN, Barb Parks NP    [START ON 5/22/2022] peg 3350-electrolytes (COLYTE) 4000 mL, 4,000 mL, Oral, ONCE, Barb Dougherty NP    Current Outpatient Medications:     atorvastatin (LIPITOR) 80 mg tablet, Take 40 mg by mouth daily. , Disp: , Rfl:       GI consult  H&H  Follow-up CT scan of the abdomen the pelvis  ________________________________________________________________________    Signed: Joaquin Lo MD

## 2022-05-20 NOTE — ROUTINE PROCESS
TRANSFER - OUT REPORT:    Verbal report given to Francia Ziegler RN(name) on Ghada Sher  being transferred to [facility](unit) for routine progression of care       Report consisted of patients Situation, Background, Assessment and   Recommendations(SBAR). Information from the following report(s) ED Summary was reviewed with the receiving nurse. Lines:   Peripheral IV 05/20/22 Anterior; Left Forearm (Active)   Site Assessment Clean, dry, & intact 05/20/22 0023   Phlebitis Assessment 0 05/20/22 0023   Infiltration Assessment 0 05/20/22 0023   Dressing Status Clean, dry, & intact 05/20/22 0023   Dressing Type Tape;Transparent 05/20/22 0023   Hub Color/Line Status Pink;Flushed 05/20/22 0023   Action Taken Blood drawn 05/20/22 0023       Peripheral IV 05/20/22 Posterior;Right Forearm (Active)   Site Assessment Clean, dry, & intact 05/20/22 0023   Phlebitis Assessment 0 05/20/22 0023   Infiltration Assessment 0 05/20/22 0023   Dressing Status Clean, dry, & intact 05/20/22 0023   Dressing Type Tape;Transparent 05/20/22 0023   Hub Color/Line Status Pink;Flushed 05/20/22 0023        Opportunity for questions and clarification was provided.       Patient transported with:   XIFIN

## 2022-05-20 NOTE — PROGRESS NOTES
Reason for Admission:  Lower GI Bleed                     RUR Score:    10%               Plan for utilizing home health:   Not at this time       PCP: First and Last name:       Name of Practice: [address] [place] [address]   Are you a current patient: Yes/No:    Approximate date of last visit: Last week   Can you participate in a virtual visit with your PCP:                     Current Advanced Directive/Advance Care Plan: Full Code      Healthcare Decision Maker:   Click here to complete [address] including selection of the Healthcare Decision Maker Relationship (ie \"Primary\")     Rhiannon Meredith, wife, Adalid 64, sister, [phone]                        Transition of Care Plan:                      Met f/f with Pt, he confirmed that the information on the face sheet is correct. Pt stated that he lives with his wife. Pt stated no HH, no DME and independent with ADL. Pt stated that he uses [address] [place] [address] for his Pharmacy. Pt stated that family will give him a ride home. CM dispo: Home with no needs at this time.

## 2022-05-20 NOTE — ED NOTES
Verbal shift change report given to Augusto Zhou (oncoming nurse) by Keyla Honeycutt RN (offgoing nurse). Report included the following information SBAR, Kardex, MAR and Recent Results.

## 2022-05-20 NOTE — ED NOTES
Assumed care of Pt at this time. Pt stable and resting  NAD noted  Bed low and locked and call bell is within reach. Pt verbalized understanding to call with needs or wants.

## 2022-05-20 NOTE — CONSULTS
Gastroenterology Consult     Referring Physician: Axel Sanchez MD     Consult Date: 5/20/2022     Subjective:     Chief Complaint: rectal bleeding    History of Present Illness: Ibis Fernando is a 78 y.o. male who is seen in consultation for GI bleed. Patient has a past medical history significant for COPD, asbestos exposure in the  and GI bleed about 20 years ago. Mr. Bronson Clarke reports yesterday he started to feel light headed with epigastric pain. He reports he had dark black stool which then changed to bright red rectal bleeding He reports about 7 bloody bowel movements yesterday but none since admission. He states about 20 years ago he experienced similar symptoms and did have about 8-9 inches of his colon removed. He denies blood thinners at this time. Haroldo Lizbeth was inserted per RN, NGT was placed to low suction and only clear water returned, no enoc blood was noted. NGT was removed. He denies recent fevers. He reports his last colonoscopy was 2 years ago and states it was a normal exam. He does admit to smoking 3 cigarettes daily but denies alcohol use. His initial hgB on admission was 10.6 but after 6 hours it dropped to 8.0. Possible secondary to diverticuli bleed. CTA of abdomen is pending. He reports his abdominal pain has improved since admission. Clear liquid diet. Plan colonoscopy on Monday May 23, 2022. If patient is discharged before may be done as out patient. Past Medical History:   Diagnosis Date    Asbestos exposure     Chronic obstructive pulmonary disease (HCC)     GI bleed     High cholesterol      History reviewed. No pertinent surgical history. History reviewed. No pertinent family history.   Social History     Tobacco Use    Smoking status: Current Every Day Smoker     Packs/day: 0.25    Smokeless tobacco: Never Used   Substance Use Topics    Alcohol use: Not Currently     Comment: 16 years without      No Known Allergies  Current Facility-Administered Medications Medication Dose Route Frequency    atorvastatin (LIPITOR) tablet 40 mg  40 mg Oral DAILY    0.9% sodium chloride infusion  75 mL/hr IntraVENous CONTINUOUS    acetaminophen (TYLENOL) tablet 650 mg  650 mg Oral Q6H PRN    Or    acetaminophen (TYLENOL) suppository 650 mg  650 mg Rectal Q6H PRN    polyethylene glycol (MIRALAX) packet 17 g  17 g Oral DAILY PRN    ondansetron (ZOFRAN ODT) tablet 4 mg  4 mg Oral Q8H PRN    Or    ondansetron (ZOFRAN) injection 4 mg  4 mg IntraVENous Q6H PRN    pantoprazole (PROTONIX) 40 mg in 0.9% sodium chloride 10 mL injection  40 mg IntraVENous DAILY     Current Outpatient Medications   Medication Sig    atorvastatin (LIPITOR) 80 mg tablet Take 40 mg by mouth daily. Review of Systems:  A detailed 10 organ review of systems is obtained with pertinent positives as listed in the History of Present Illness and Past Medical History. All others are negative. Objective:     Physical Exam:  Visit Vitals  /74   Pulse 82   Temp 98.1 °F (36.7 °C)   Resp 16   Ht 5' 10\" (1.778 m)   Wt 62.1 kg (137 lb)   SpO2 96%   BMI 19.66 kg/m²        Skin:  Extremities and face reveal no rashes. No iraheta erythema. No telangiectasias on the chest wall. HEENT: Sclerae anicteric. Extra-occular muscles are intact. No oral ulcers. No abnormal pigmentation of the lips. The neck is supple. Cardiovascular: Regular rate and rhythm. Respiratory:  Comfortable breathing with no accessory muscle use. GI:  Abdomen nondistended, soft, and nontender. Normal active bowel sounds. No enlargement of the liver or spleen. No masses palpable. Rectal:  Deferred  Musculoskeletal: Extremities have good range of motion. Neurological:  Gross memory appears intact. Patient is alert and oriented. Psychiatric:  Mood appears appropriate with judgement intact. Lymphatic:  No cervical or supraclavicular adenopathy.     Lab/Data Review:  CMP:   Lab Results   Component Value Date/Time     05/20/2022 12:15 AM    K 4.3 05/20/2022 12:15 AM     05/20/2022 12:15 AM    CO2 26 05/20/2022 12:15 AM    AGAP 8 05/20/2022 12:15 AM     (H) 05/20/2022 12:15 AM    BUN 42 (H) 05/20/2022 12:15 AM    CREA 1.10 05/20/2022 12:15 AM    GFRAA >60 05/20/2022 12:15 AM    GFRNA >60 05/20/2022 12:15 AM    CA 8.4 (L) 05/20/2022 12:15 AM    ALB 3.1 (L) 05/20/2022 12:15 AM    TP 6.3 (L) 05/20/2022 12:15 AM    GLOB 3.2 05/20/2022 12:15 AM    AGRAT 1.0 (L) 05/20/2022 12:15 AM    ALT 27 05/20/2022 12:15 AM     CBC:   Lab Results   Component Value Date/Time    WBC 9.3 05/20/2022 06:04 AM    HGB 8.0 (L) 05/20/2022 06:04 AM    HCT 23.7 (L) 05/20/2022 06:04 AM     05/20/2022 06:04 AM         Assessment/Plan:   1. GI Bleed ( possible 2/2 diverticuli bleed)      Reports of hematochezia and melena ( none since admission)      HgB 8.0      Monitor H&H      Clear Liquid diet      Transfuse for hgB less than 7.0       Protonix 40 mg IV BID      CTA abdomen pending      Colonoscopy Monday May 23,2022      NPO after midnight 5/23/22      Start prep on 5/22/22 at 1700      Continue IV hydration  2. COPD (not in exacerbation)      If patient is discharged prior to Monday colonoscopy may be done as out patient. Thank you for allowing me to participate in this patients care. Plan discussed with Dr. Pili Rodriguez and he approves.

## 2022-05-20 NOTE — ED NOTES
Pt resting at this time, States that he is having some discomfort in his abdomen. Dr. Nydia Briseno made aware. New med ordered.

## 2022-05-20 NOTE — ED NOTES
#14 [place] sump inserted into right nare as ordered. . Placement confirmed with air bolus. .. 150ml water instilled into pt's abd and suctioned out also as ordered. NGT placed to low wall suction and noted only the clear water return. . no enoc blood noted. Meli Neighbours NGT removed also as ordered by Dr. Jimenez Yip.

## 2022-05-21 ENCOUNTER — APPOINTMENT (OUTPATIENT)
Dept: GENERAL RADIOLOGY | Age: 77
DRG: 377 | End: 2022-05-21
Attending: FAMILY MEDICINE
Payer: MEDICARE

## 2022-05-21 LAB
ALBUMIN SERPL-MCNC: 2.7 G/DL (ref 3.5–5)
ALBUMIN/GLOB SERPL: 1.2 {RATIO} (ref 1.1–2.2)
ALP SERPL-CCNC: 68 U/L (ref 45–117)
ALT SERPL-CCNC: 17 U/L (ref 12–78)
ANION GAP SERPL CALC-SCNC: 2 MMOL/L (ref 5–15)
AST SERPL W P-5'-P-CCNC: 14 U/L (ref 15–37)
BASOPHILS # BLD: 0 K/UL (ref 0–0.1)
BASOPHILS NFR BLD: 1 % (ref 0–1)
BILIRUB SERPL-MCNC: 0.4 MG/DL (ref 0.2–1)
BUN SERPL-MCNC: 17 MG/DL (ref 6–20)
BUN/CREAT SERPL: 25 (ref 12–20)
CA-I BLD-MCNC: 8 MG/DL (ref 8.5–10.1)
CHLORIDE SERPL-SCNC: 111 MMOL/L (ref 97–108)
CO2 SERPL-SCNC: 27 MMOL/L (ref 21–32)
CREAT SERPL-MCNC: 0.67 MG/DL (ref 0.7–1.3)
DIFFERENTIAL METHOD BLD: ABNORMAL
EOSINOPHIL # BLD: 0.2 K/UL (ref 0–0.4)
EOSINOPHIL NFR BLD: 3 % (ref 0–7)
ERYTHROCYTE [DISTWIDTH] IN BLOOD BY AUTOMATED COUNT: 12.4 % (ref 11.5–14.5)
GLOBULIN SER CALC-MCNC: 2.2 G/DL (ref 2–4)
GLUCOSE SERPL-MCNC: 88 MG/DL (ref 65–100)
HCT VFR BLD AUTO: 20.4 % (ref 36.6–50.3)
HGB BLD-MCNC: 6.8 G/DL (ref 12.1–17)
IMM GRANULOCYTES # BLD AUTO: 0 K/UL (ref 0–0.04)
IMM GRANULOCYTES NFR BLD AUTO: 0 % (ref 0–0.5)
LYMPHOCYTES # BLD: 2.2 K/UL (ref 0.8–3.5)
LYMPHOCYTES NFR BLD: 32 % (ref 12–49)
MCH RBC QN AUTO: 32.2 PG (ref 26–34)
MCHC RBC AUTO-ENTMCNC: 33.3 G/DL (ref 30–36.5)
MCV RBC AUTO: 96.7 FL (ref 80–99)
MONOCYTES # BLD: 0.5 K/UL (ref 0–1)
MONOCYTES NFR BLD: 7 % (ref 5–13)
NEUTS SEG # BLD: 4 K/UL (ref 1.8–8)
NEUTS SEG NFR BLD: 57 % (ref 32–75)
NRBC # BLD: 0 K/UL (ref 0–0.01)
NRBC BLD-RTO: 0 PER 100 WBC
PLATELET # BLD AUTO: 190 K/UL (ref 150–400)
PMV BLD AUTO: 10 FL (ref 8.9–12.9)
POTASSIUM SERPL-SCNC: 4.1 MMOL/L (ref 3.5–5.1)
PROT SERPL-MCNC: 4.9 G/DL (ref 6.4–8.2)
RBC # BLD AUTO: 2.11 M/UL (ref 4.1–5.7)
SODIUM SERPL-SCNC: 140 MMOL/L (ref 136–145)
WBC # BLD AUTO: 6.9 K/UL (ref 4.1–11.1)

## 2022-05-21 PROCEDURE — 85025 COMPLETE CBC W/AUTO DIFF WBC: CPT

## 2022-05-21 PROCEDURE — 36415 COLL VENOUS BLD VENIPUNCTURE: CPT

## 2022-05-21 PROCEDURE — 94640 AIRWAY INHALATION TREATMENT: CPT

## 2022-05-21 PROCEDURE — 80053 COMPREHEN METABOLIC PANEL: CPT

## 2022-05-21 PROCEDURE — 71045 X-RAY EXAM CHEST 1 VIEW: CPT

## 2022-05-21 PROCEDURE — C9113 INJ PANTOPRAZOLE SODIUM, VIA: HCPCS | Performed by: NURSE PRACTITIONER

## 2022-05-21 PROCEDURE — P9016 RBC LEUKOCYTES REDUCED: HCPCS

## 2022-05-21 PROCEDURE — 65270000029 HC RM PRIVATE

## 2022-05-21 PROCEDURE — 74011250636 HC RX REV CODE- 250/636: Performed by: NURSE PRACTITIONER

## 2022-05-21 PROCEDURE — 30233N1 TRANSFUSION OF NONAUTOLOGOUS RED BLOOD CELLS INTO PERIPHERAL VEIN, PERCUTANEOUS APPROACH: ICD-10-PCS | Performed by: FAMILY MEDICINE

## 2022-05-21 PROCEDURE — 74011000250 HC RX REV CODE- 250: Performed by: FAMILY MEDICINE

## 2022-05-21 PROCEDURE — 94761 N-INVAS EAR/PLS OXIMETRY MLT: CPT

## 2022-05-21 PROCEDURE — 77010033678 HC OXYGEN DAILY

## 2022-05-21 PROCEDURE — 74011250637 HC RX REV CODE- 250/637: Performed by: FAMILY MEDICINE

## 2022-05-21 PROCEDURE — 36430 TRANSFUSION BLD/BLD COMPNT: CPT

## 2022-05-21 PROCEDURE — 74011000250 HC RX REV CODE- 250: Performed by: NURSE PRACTITIONER

## 2022-05-21 PROCEDURE — 74011250636 HC RX REV CODE- 250/636: Performed by: FAMILY MEDICINE

## 2022-05-21 RX ORDER — TRAMADOL HYDROCHLORIDE 50 MG/1
50 TABLET ORAL
Status: DISCONTINUED | OUTPATIENT
Start: 2022-05-21 | End: 2022-05-24 | Stop reason: HOSPADM

## 2022-05-21 RX ORDER — SODIUM CHLORIDE 9 MG/ML
250 INJECTION, SOLUTION INTRAVENOUS AS NEEDED
Status: DISCONTINUED | OUTPATIENT
Start: 2022-05-21 | End: 2022-05-24 | Stop reason: HOSPADM

## 2022-05-21 RX ORDER — BUDESONIDE AND FORMOTEROL FUMARATE DIHYDRATE 160; 4.5 UG/1; UG/1
2 AEROSOL RESPIRATORY (INHALATION) 2 TIMES DAILY
Status: DISCONTINUED | OUTPATIENT
Start: 2022-05-21 | End: 2022-05-24 | Stop reason: HOSPADM

## 2022-05-21 RX ORDER — IPRATROPIUM BROMIDE AND ALBUTEROL SULFATE 2.5; .5 MG/3ML; MG/3ML
3 SOLUTION RESPIRATORY (INHALATION)
Status: DISCONTINUED | OUTPATIENT
Start: 2022-05-21 | End: 2022-05-23

## 2022-05-21 RX ADMIN — SODIUM CHLORIDE 75 ML/HR: 9 INJECTION, SOLUTION INTRAVENOUS at 23:20

## 2022-05-21 RX ADMIN — SODIUM CHLORIDE, PRESERVATIVE FREE 40 MG: 5 INJECTION INTRAVENOUS at 09:45

## 2022-05-21 RX ADMIN — ATORVASTATIN CALCIUM 40 MG: 40 TABLET, FILM COATED ORAL at 09:45

## 2022-05-21 RX ADMIN — IPRATROPIUM BROMIDE AND ALBUTEROL SULFATE 3 ML: .5; 3 SOLUTION RESPIRATORY (INHALATION) at 18:30

## 2022-05-21 RX ADMIN — ACETAMINOPHEN 650 MG: 325 TABLET ORAL at 09:45

## 2022-05-21 RX ADMIN — BUDESONIDE AND FORMOTEROL FUMARATE DIHYDRATE 2 PUFF: 160; 4.5 AEROSOL RESPIRATORY (INHALATION) at 18:30

## 2022-05-21 RX ADMIN — METHYLPREDNISOLONE SODIUM SUCCINATE 40 MG: 40 INJECTION, POWDER, FOR SOLUTION INTRAMUSCULAR; INTRAVENOUS at 17:18

## 2022-05-21 RX ADMIN — METHYLPREDNISOLONE SODIUM SUCCINATE 40 MG: 40 INJECTION, POWDER, FOR SOLUTION INTRAMUSCULAR; INTRAVENOUS at 23:20

## 2022-05-21 RX ADMIN — TRAMADOL HYDROCHLORIDE 50 MG: 50 TABLET, COATED ORAL at 17:18

## 2022-05-21 RX ADMIN — SODIUM CHLORIDE 75 ML/HR: 9 INJECTION, SOLUTION INTRAVENOUS at 09:38

## 2022-05-21 RX ADMIN — IPRATROPIUM BROMIDE AND ALBUTEROL SULFATE 3 ML: .5; 3 SOLUTION RESPIRATORY (INHALATION) at 11:43

## 2022-05-21 RX ADMIN — SODIUM CHLORIDE, PRESERVATIVE FREE 40 MG: 5 INJECTION INTRAVENOUS at 23:21

## 2022-05-21 NOTE — PROGRESS NOTES
Pt c/o feeling Zelda Ollie" denies feeling bad or any distress, vs taken and stable at this time, pt did rec an albuterol trx from resp approx an hour ago,  unit of Rbc's continuing to transfuse at this time, Dr Arthur Alfaro made aware, continue to monitor pt

## 2022-05-21 NOTE — PROGRESS NOTES
General Daily Progress Note          Patient Name:   Ghada Sher       YOB: 1945       Age:  78 y.o. Admit Date: 5/19/2022      Subjective:     Patient is a 78y.o. year old male with a PMHx significant for COPD, hypercholesteremia, tobacco use, prior lower GI bleed 20 years ago, who presented to the ED for rectal bleeding for the past day. Patient states that around 2pm yesterday he began having dark tarry stools which transitioned to bright red stools, he had 7 episodes of bloody stools yesterday. He has associated mid epigastric pain that is worse with movement and chills Per wife, patient had a similar episode of GI bleeding 20 years ago and had part of his colon resected. Patient denies any sick contacts, new medication changes, new foods or recent travel. He denies fever, chest pain, SOB, nausea, vomiting, headache. He is a current tobacco smoker and reports smoking 3 cigarettes daily.      ER workup showed initial Hgb was 10.6 which dropped down to 8.0 after six hours. Patient was seen by the ER physician and admitted for further evaluation and treatment.         Hemoglobin dropped to 6.8      Objective:     Visit Vitals  /61 (BP 1 Location: Right upper arm)   Pulse 77   Temp 97.8 °F (36.6 °C)   Resp 18   Ht 5' 10\" (1.778 m)   Wt 62.1 kg (137 lb)   SpO2 99%   BMI 19.66 kg/m²        Recent Results (from the past 24 hour(s))   METABOLIC PANEL, COMPREHENSIVE    Collection Time: 05/21/22  6:54 AM   Result Value Ref Range    Sodium 140 136 - 145 mmol/L    Potassium 4.1 3.5 - 5.1 mmol/L    Chloride 111 (H) 97 - 108 mmol/L    CO2 27 21 - 32 mmol/L    Anion gap 2 (L) 5 - 15 mmol/L    Glucose 88 65 - 100 mg/dL    BUN 17 6 - 20 mg/dL    Creatinine 0.67 (L) 0.70 - 1.30 mg/dL    BUN/Creatinine ratio 25 (H) 12 - 20      GFR est AA >60 >60 ml/min/1.73m2    GFR est non-AA >60 >60 ml/min/1.73m2    Calcium 8.0 (L) 8.5 - 10.1 mg/dL    Bilirubin, total 0.4 0.2 - 1.0 mg/dL    AST (SGOT) 14 (L) 15 - 37 U/L    ALT (SGPT) 17 12 - 78 U/L    Alk. phosphatase 68 45 - 117 U/L    Protein, total 4.9 (L) 6.4 - 8.2 g/dL    Albumin 2.7 (L) 3.5 - 5.0 g/dL    Globulin 2.2 2.0 - 4.0 g/dL    A-G Ratio 1.2 1.1 - 2.2     CBC WITH AUTOMATED DIFF    Collection Time: 05/21/22  6:54 AM   Result Value Ref Range    WBC 6.9 4.1 - 11.1 K/uL    RBC 2.11 (L) 4.10 - 5.70 M/uL    HGB 6.8 (L) 12.1 - 17.0 g/dL    HCT 20.4 (L) 36.6 - 50.3 %    MCV 96.7 80.0 - 99.0 FL    MCH 32.2 26.0 - 34.0 PG    MCHC 33.3 30.0 - 36.5 g/dL    RDW 12.4 11.5 - 14.5 %    PLATELET 555 962 - 334 K/uL    MPV 10.0 8.9 - 12.9 FL    NRBC 0.0 0.0  WBC    ABSOLUTE NRBC 0.00 0.00 - 0.01 K/uL    NEUTROPHILS 57 32 - 75 %    LYMPHOCYTES 32 12 - 49 %    MONOCYTES 7 5 - 13 %    EOSINOPHILS 3 0 - 7 %    BASOPHILS 1 0 - 1 %    IMMATURE GRANULOCYTES 0 0 - 0.5 %    ABS. NEUTROPHILS 4.0 1.8 - 8.0 K/UL    ABS. LYMPHOCYTES 2.2 0.8 - 3.5 K/UL    ABS. MONOCYTES 0.5 0.0 - 1.0 K/UL    ABS. EOSINOPHILS 0.2 0.0 - 0.4 K/UL    ABS. BASOPHILS 0.0 0.0 - 0.1 K/UL    ABS. IMM. GRANS. 0.0 0.00 - 0.04 K/UL    DF AUTOMATED       [unfilled]      Review of Systems    Constitutional: Negative for chills and fever. HENT: Negative. Eyes: Negative. Respiratory: Negative. Cardiovascular: Negative. Gastrointestinal: Negative for abdominal pain and nausea. Skin: Negative. Neurological: Negative. Physical Exam:      Constitutional: pt is oriented to person, place, and time. HENT:   Head: Normocephalic and atraumatic. Eyes: Pupils are equal, round, and reactive to light. EOM are normal.   Cardiovascular: Normal rate, regular rhythm and normal heart sounds. Pulmonary/Chest: Breath sounds normal. No wheezes. No rales. Exhibits no tenderness. Abdominal: Soft. Bowel sounds are normal. There is no abdominal tenderness. There is no rebound and no guarding. Musculoskeletal: Normal range of motion. Neurological: pt is alert and oriented to person, place, and time. CTA ABD PELV W WO CONT   Final Result   No evidence of active bleeding or demonstration of bleeding source. 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           Recent Results (from the past 24 hour(s))   METABOLIC PANEL, COMPREHENSIVE    Collection Time: 05/21/22  6:54 AM   Result Value Ref Range    Sodium 140 136 - 145 mmol/L    Potassium 4.1 3.5 - 5.1 mmol/L    Chloride 111 (H) 97 - 108 mmol/L    CO2 27 21 - 32 mmol/L    Anion gap 2 (L) 5 - 15 mmol/L    Glucose 88 65 - 100 mg/dL    BUN 17 6 - 20 mg/dL    Creatinine 0.67 (L) 0.70 - 1.30 mg/dL    BUN/Creatinine ratio 25 (H) 12 - 20      GFR est AA >60 >60 ml/min/1.73m2    GFR est non-AA >60 >60 ml/min/1.73m2    Calcium 8.0 (L) 8.5 - 10.1 mg/dL    Bilirubin, total 0.4 0.2 - 1.0 mg/dL    AST (SGOT) 14 (L) 15 - 37 U/L    ALT (SGPT) 17 12 - 78 U/L    Alk. phosphatase 68 45 - 117 U/L    Protein, total 4.9 (L) 6.4 - 8.2 g/dL    Albumin 2.7 (L) 3.5 - 5.0 g/dL    Globulin 2.2 2.0 - 4.0 g/dL    A-G Ratio 1.2 1.1 - 2.2     CBC WITH AUTOMATED DIFF    Collection Time: 05/21/22  6:54 AM   Result Value Ref Range    WBC 6.9 4.1 - 11.1 K/uL    RBC 2.11 (L) 4.10 - 5.70 M/uL    HGB 6.8 (L) 12.1 - 17.0 g/dL    HCT 20.4 (L) 36.6 - 50.3 %    MCV 96.7 80.0 - 99.0 FL    MCH 32.2 26.0 - 34.0 PG    MCHC 33.3 30.0 - 36.5 g/dL    RDW 12.4 11.5 - 14.5 %    PLATELET 597 900 - 676 K/uL    MPV 10.0 8.9 - 12.9 FL    NRBC 0.0 0.0  WBC    ABSOLUTE NRBC 0.00 0.00 - 0.01 K/uL    NEUTROPHILS 57 32 - 75 %    LYMPHOCYTES 32 12 - 49 %    MONOCYTES 7 5 - 13 %    EOSINOPHILS 3 0 - 7 %    BASOPHILS 1 0 - 1 %    IMMATURE GRANULOCYTES 0 0 - 0.5 %    ABS. NEUTROPHILS 4.0 1.8 - 8.0 K/UL    ABS. LYMPHOCYTES 2.2 0.8 - 3.5 K/UL    ABS. MONOCYTES 0.5 0.0 - 1.0 K/UL    ABS. EOSINOPHILS 0.2 0.0 - 0.4 K/UL    ABS. BASOPHILS 0.0 0.0 - 0.1 K/UL    ABS. IMM. GRANS. 0.0 0.00 - 0.04 K/UL    DF AUTOMATED         Results     ** No results found for the last 336 hours.  ** Labs:     Recent Labs     05/21/22  0654 05/20/22  0604   WBC 6.9 9.3   HGB 6.8* 8.0*   HCT 20.4* 23.7*    211     Recent Labs     05/21/22  0654 05/20/22  0015    138   K 4.1 4.3   * 104   CO2 27 26   BUN 17 42*   CREA 0.67* 1.10   GLU 88 152*   CA 8.0* 8.4*     Recent Labs     05/21/22  0654 05/20/22  0015   ALT 17 27   AP 68 85   TBILI 0.4 0.3   TP 4.9* 6.3*   ALB 2.7* 3.1*   GLOB 2.2 3.2     Recent Labs     05/20/22  0015   INR 1.0   PTP 9.9   APTT 21.8*      No results for input(s): FE, TIBC, PSAT, FERR in the last 72 hours. No results found for: FOL, RBCF   No results for input(s): PH, PCO2, PO2 in the last 72 hours. No results for input(s): CPK, CKNDX, TROIQ in the last 72 hours.     No lab exists for component: CPKMB  No results found for: CHOL, CHOLX, CHLST, CHOLV, HDL, HDLP, LDL, LDLC, DLDLP, TGLX, TRIGL, TRIGP, CHHD, CHHDX  No results found for: GLUCPOC  No results found for: COLOR, APPRN, SPGRU, REFSG, LASHELL, PROTU, GLUCU, KETU, BILU, UROU, PEDRO, LEUKU, GLUKE, EPSU, BACTU, WBCU, RBCU, CASTS, UCRY      Assessment:     Acute hypoxic respiratory failure 2 L  GI bleed   COPD  Tobacco use  Hyperlipidemia  Acute blood loss anemia      Plan:     Transfuse 1 unit packed RBC  Monitor H&H  Start on nebulizer treatment IV Solu-Medrol  Will do chest x-ray          Current Facility-Administered Medications:     0.9% sodium chloride infusion 250 mL, 250 mL, IntraVENous, PRN, Jena Johansen MD    atorvastatin (LIPITOR) tablet 40 mg, 40 mg, Oral, DAILY, Jena Johansen MD, 40 mg at 05/21/22 0945    0.9% sodium chloride infusion, 75 mL/hr, IntraVENous, CONTINUOUS, Jena Johansen MD, Last Rate: 75 mL/hr at 05/21/22 0938, 75 mL/hr at 05/21/22 0938    acetaminophen (TYLENOL) tablet 650 mg, 650 mg, Oral, Q6H PRN, 650 mg at 05/21/22 0945 **OR** acetaminophen (TYLENOL) suppository 650 mg, 650 mg, Rectal, Q6H PRN, Jena Johansen MD    polyethylene glycol (MIRALAX) packet 17 g, 17 g, Oral, DAILY PRN, Maribell Johansen MD    ondansetron (ZOFRAN ODT) tablet 4 mg, 4 mg, Oral, Q8H PRN **OR** ondansetron (ZOFRAN) injection 4 mg, 4 mg, IntraVENous, Q6H PRN, Jena Johansen MD    pantoprazole (PROTONIX) 40 mg in 0.9% sodium chloride 10 mL injection, 40 mg, IntraVENous, Q12H, Dougherty, Barb F, NP, 40 mg at 05/21/22 0945    sodium chloride (NS) flush 5-40 mL, 5-40 mL, IntraVENous, Q8H, Dougherty, Barb F, NP, 10 mL at 05/20/22 1925    sodium chloride (NS) flush 5-40 mL, 5-40 mL, IntraVENous, PRN, Dougherty, Barb F, NP    [START ON 5/22/2022] peg 3350-electrolytes (COLYTE) 4000 mL, 4,000 mL, Oral, ONCE, DoughertyElizabeth leger, NP

## 2022-05-22 LAB
ANION GAP SERPL CALC-SCNC: 9 MMOL/L (ref 5–15)
BASOPHILS # BLD: 0 K/UL (ref 0–0.1)
BASOPHILS NFR BLD: 0 % (ref 0–1)
BUN SERPL-MCNC: 10 MG/DL (ref 6–20)
BUN/CREAT SERPL: 10 (ref 12–20)
CA-I BLD-MCNC: 8.5 MG/DL (ref 8.5–10.1)
CHLORIDE SERPL-SCNC: 110 MMOL/L (ref 97–108)
CO2 SERPL-SCNC: 20 MMOL/L (ref 21–32)
CREAT SERPL-MCNC: 1.02 MG/DL (ref 0.7–1.3)
DIFFERENTIAL METHOD BLD: ABNORMAL
EOSINOPHIL # BLD: 0 K/UL (ref 0–0.4)
EOSINOPHIL NFR BLD: 0 % (ref 0–7)
ERYTHROCYTE [DISTWIDTH] IN BLOOD BY AUTOMATED COUNT: 14.3 % (ref 11.5–14.5)
GLUCOSE SERPL-MCNC: 216 MG/DL (ref 65–100)
HCT VFR BLD AUTO: 21 % (ref 36.6–50.3)
HGB BLD-MCNC: 7.2 G/DL (ref 12.1–17)
IMM GRANULOCYTES # BLD AUTO: 0.1 K/UL (ref 0–0.04)
IMM GRANULOCYTES NFR BLD AUTO: 1 % (ref 0–0.5)
LYMPHOCYTES # BLD: 0.6 K/UL (ref 0.8–3.5)
LYMPHOCYTES NFR BLD: 7 % (ref 12–49)
MCH RBC QN AUTO: 32.4 PG (ref 26–34)
MCHC RBC AUTO-ENTMCNC: 34.3 G/DL (ref 30–36.5)
MCV RBC AUTO: 94.6 FL (ref 80–99)
MONOCYTES # BLD: 0.2 K/UL (ref 0–1)
MONOCYTES NFR BLD: 2 % (ref 5–13)
NEUTS SEG # BLD: 8.5 K/UL (ref 1.8–8)
NEUTS SEG NFR BLD: 90 % (ref 32–75)
NRBC # BLD: 0 K/UL (ref 0–0.01)
NRBC BLD-RTO: 0 PER 100 WBC
PLATELET # BLD AUTO: 192 K/UL (ref 150–400)
PMV BLD AUTO: 9.7 FL (ref 8.9–12.9)
POTASSIUM SERPL-SCNC: 3.8 MMOL/L (ref 3.5–5.1)
RBC # BLD AUTO: 2.22 M/UL (ref 4.1–5.7)
SODIUM SERPL-SCNC: 139 MMOL/L (ref 136–145)
WBC # BLD AUTO: 9.3 K/UL (ref 4.1–11.1)

## 2022-05-22 PROCEDURE — 74011250636 HC RX REV CODE- 250/636: Performed by: FAMILY MEDICINE

## 2022-05-22 PROCEDURE — 85025 COMPLETE CBC W/AUTO DIFF WBC: CPT

## 2022-05-22 PROCEDURE — 74011250636 HC RX REV CODE- 250/636: Performed by: NURSE PRACTITIONER

## 2022-05-22 PROCEDURE — 94640 AIRWAY INHALATION TREATMENT: CPT

## 2022-05-22 PROCEDURE — 36415 COLL VENOUS BLD VENIPUNCTURE: CPT

## 2022-05-22 PROCEDURE — C9113 INJ PANTOPRAZOLE SODIUM, VIA: HCPCS | Performed by: NURSE PRACTITIONER

## 2022-05-22 PROCEDURE — 74011000250 HC RX REV CODE- 250: Performed by: NURSE PRACTITIONER

## 2022-05-22 PROCEDURE — 65270000029 HC RM PRIVATE

## 2022-05-22 PROCEDURE — 80048 BASIC METABOLIC PNL TOTAL CA: CPT

## 2022-05-22 PROCEDURE — 74011000250 HC RX REV CODE- 250: Performed by: FAMILY MEDICINE

## 2022-05-22 PROCEDURE — 94761 N-INVAS EAR/PLS OXIMETRY MLT: CPT

## 2022-05-22 PROCEDURE — 74011250637 HC RX REV CODE- 250/637: Performed by: FAMILY MEDICINE

## 2022-05-22 RX ADMIN — SODIUM CHLORIDE, PRESERVATIVE FREE 40 MG: 5 INJECTION INTRAVENOUS at 21:27

## 2022-05-22 RX ADMIN — SODIUM CHLORIDE, PRESERVATIVE FREE 40 MG: 5 INJECTION INTRAVENOUS at 08:15

## 2022-05-22 RX ADMIN — SODIUM CHLORIDE 75 ML/HR: 9 INJECTION, SOLUTION INTRAVENOUS at 12:08

## 2022-05-22 RX ADMIN — IPRATROPIUM BROMIDE AND ALBUTEROL SULFATE 3 ML: .5; 3 SOLUTION RESPIRATORY (INHALATION) at 20:12

## 2022-05-22 RX ADMIN — IPRATROPIUM BROMIDE AND ALBUTEROL SULFATE 3 ML: .5; 3 SOLUTION RESPIRATORY (INHALATION) at 03:06

## 2022-05-22 RX ADMIN — POLYETHYLENE GLYCOL 3350, SODIUM SULFATE ANHYDROUS, SODIUM BICARBONATE, SODIUM CHLORIDE, POTASSIUM CHLORIDE 4000 ML: 236; 22.74; 6.74; 5.86; 2.97 POWDER, FOR SOLUTION ORAL at 17:01

## 2022-05-22 RX ADMIN — BUDESONIDE AND FORMOTEROL FUMARATE DIHYDRATE 2 PUFF: 160; 4.5 AEROSOL RESPIRATORY (INHALATION) at 20:12

## 2022-05-22 RX ADMIN — SODIUM CHLORIDE, PRESERVATIVE FREE 10 ML: 5 INJECTION INTRAVENOUS at 06:44

## 2022-05-22 RX ADMIN — METHYLPREDNISOLONE SODIUM SUCCINATE 40 MG: 40 INJECTION, POWDER, FOR SOLUTION INTRAMUSCULAR; INTRAVENOUS at 06:44

## 2022-05-22 RX ADMIN — BUDESONIDE AND FORMOTEROL FUMARATE DIHYDRATE 2 PUFF: 160; 4.5 AEROSOL RESPIRATORY (INHALATION) at 07:39

## 2022-05-22 RX ADMIN — IPRATROPIUM BROMIDE AND ALBUTEROL SULFATE 3 ML: .5; 3 SOLUTION RESPIRATORY (INHALATION) at 13:47

## 2022-05-22 RX ADMIN — METHYLPREDNISOLONE SODIUM SUCCINATE 40 MG: 40 INJECTION, POWDER, FOR SOLUTION INTRAMUSCULAR; INTRAVENOUS at 17:01

## 2022-05-22 RX ADMIN — SODIUM CHLORIDE, PRESERVATIVE FREE 10 ML: 5 INJECTION INTRAVENOUS at 21:27

## 2022-05-22 RX ADMIN — ATORVASTATIN CALCIUM 40 MG: 40 TABLET, FILM COATED ORAL at 08:15

## 2022-05-22 RX ADMIN — SODIUM CHLORIDE, PRESERVATIVE FREE 10 ML: 5 INJECTION INTRAVENOUS at 17:11

## 2022-05-22 RX ADMIN — METHYLPREDNISOLONE SODIUM SUCCINATE 40 MG: 40 INJECTION, POWDER, FOR SOLUTION INTRAMUSCULAR; INTRAVENOUS at 12:02

## 2022-05-22 RX ADMIN — IPRATROPIUM BROMIDE AND ALBUTEROL SULFATE 3 ML: .5; 3 SOLUTION RESPIRATORY (INHALATION) at 07:39

## 2022-05-22 RX ADMIN — SODIUM CHLORIDE 75 ML/HR: 9 INJECTION, SOLUTION INTRAVENOUS at 12:07

## 2022-05-22 NOTE — PROGRESS NOTES
General Daily Progress Note          Patient Name:   Reyna Murphy       YOB: 1945       Age:  78 y.o. Admit Date: 5/19/2022      Subjective:     Patient is a 78y.o. year old male with a PMHx significant for COPD, hypercholesteremia, tobacco use, prior lower GI bleed 20 years ago, who presented to the ED for rectal bleeding for the past day. Patient states that around 2pm yesterday he began having dark tarry stools which transitioned to bright red stools, he had 7 episodes of bloody stools yesterday. He has associated mid epigastric pain that is worse with movement and chills Per wife, patient had a similar episode of GI bleeding 20 years ago and had part of his colon resected. Patient denies any sick contacts, new medication changes, new foods or recent travel. He denies fever, chest pain, SOB, nausea, vomiting, headache. He is a current tobacco smoker and reports smoking 3 cigarettes daily.      ER workup showed initial Hgb was 10.6 which dropped down to 8.0 after six hours. Patient was seen by the ER physician and admitted for further evaluation and treatment.         Hemoglobin dropped to 6.8      Objective:     Visit Vitals  /64   Pulse 90   Temp 98.1 °F (36.7 °C)   Resp 18   Ht 5' 10\" (1.778 m)   Wt 62.1 kg (137 lb)   SpO2 (!) 82%   BMI 19.66 kg/m²        Recent Results (from the past 24 hour(s))   CBC WITH AUTOMATED DIFF    Collection Time: 05/22/22 12:47 PM   Result Value Ref Range    WBC 9.3 4.1 - 11.1 K/uL    RBC 2.22 (L) 4.10 - 5.70 M/uL    HGB 7.2 (L) 12.1 - 17.0 g/dL    HCT 21.0 (L) 36.6 - 50.3 %    MCV 94.6 80.0 - 99.0 FL    MCH 32.4 26.0 - 34.0 PG    MCHC 34.3 30.0 - 36.5 g/dL    RDW 14.3 11.5 - 14.5 %    PLATELET 259 453 - 647 K/uL    MPV 9.7 8.9 - 12.9 FL    NRBC 0.0 0.0  WBC    ABSOLUTE NRBC 0.00 0.00 - 0.01 K/uL    NEUTROPHILS 90 (H) 32 - 75 %    LYMPHOCYTES 7 (L) 12 - 49 %    MONOCYTES 2 (L) 5 - 13 %    EOSINOPHILS 0 0 - 7 %    BASOPHILS 0 0 - 1 %    IMMATURE GRANULOCYTES 1 (H) 0 - 0.5 %    ABS. NEUTROPHILS 8.5 (H) 1.8 - 8.0 K/UL    ABS. LYMPHOCYTES 0.6 (L) 0.8 - 3.5 K/UL    ABS. MONOCYTES 0.2 0.0 - 1.0 K/UL    ABS. EOSINOPHILS 0.0 0.0 - 0.4 K/UL    ABS. BASOPHILS 0.0 0.0 - 0.1 K/UL    ABS. IMM. GRANS. 0.1 (H) 0.00 - 0.04 K/UL    DF AUTOMATED     METABOLIC PANEL, BASIC    Collection Time: 05/22/22 12:47 PM   Result Value Ref Range    Sodium 139 136 - 145 mmol/L    Potassium 3.8 3.5 - 5.1 mmol/L    Chloride 110 (H) 97 - 108 mmol/L    CO2 20 (L) 21 - 32 mmol/L    Anion gap 9 5 - 15 mmol/L    Glucose 216 (H) 65 - 100 mg/dL    BUN 10 6 - 20 mg/dL    Creatinine 1.02 0.70 - 1.30 mg/dL    BUN/Creatinine ratio 10 (L) 12 - 20      GFR est AA >60 >60 ml/min/1.73m2    GFR est non-AA >60 >60 ml/min/1.73m2    Calcium 8.5 8.5 - 10.1 mg/dL     [unfilled]      Review of Systems    Constitutional: Negative for chills and fever. HENT: Negative. Eyes: Negative. Respiratory: Negative. Cardiovascular: Negative. Gastrointestinal: Negative for abdominal pain and nausea. Skin: Negative. Neurological: Negative. Physical Exam:      Constitutional: pt is oriented to person, place, and time. HENT:   Head: Normocephalic and atraumatic. Eyes: Pupils are equal, round, and reactive to light. EOM are normal.   Cardiovascular: Normal rate, regular rhythm and normal heart sounds. Pulmonary/Chest: Breath sounds normal. No wheezes. No rales. Exhibits no tenderness. Abdominal: Soft. Bowel sounds are normal. There is no abdominal tenderness. There is no rebound and no guarding. Musculoskeletal: Normal range of motion. Neurological: pt is alert and oriented to person, place, and time. XR CHEST PORT   Final Result      2 portable AP views. No prior exams. Monitoring equipment overlies the body. Narrow heart. No mediastinal widening or shift. Hyperexpanded lungs consistent   with COPD. Right perihilar calcifications. No spiculated lung mass.  Mild   scarring in the upper zones. Suspect calcified pleural plaques on the right. No consolidation. Negative for pulmonary edema, pleural effusion or pneumothorax. No acute fractures. Negative for subdiaphragmatic free air. CTA ABD PELV W WO CONT   Final Result   No evidence of active bleeding or demonstration of bleeding source. 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           Recent Results (from the past 24 hour(s))   CBC WITH AUTOMATED DIFF    Collection Time: 05/22/22 12:47 PM   Result Value Ref Range    WBC 9.3 4.1 - 11.1 K/uL    RBC 2.22 (L) 4.10 - 5.70 M/uL    HGB 7.2 (L) 12.1 - 17.0 g/dL    HCT 21.0 (L) 36.6 - 50.3 %    MCV 94.6 80.0 - 99.0 FL    MCH 32.4 26.0 - 34.0 PG    MCHC 34.3 30.0 - 36.5 g/dL    RDW 14.3 11.5 - 14.5 %    PLATELET 391 745 - 025 K/uL    MPV 9.7 8.9 - 12.9 FL    NRBC 0.0 0.0  WBC    ABSOLUTE NRBC 0.00 0.00 - 0.01 K/uL    NEUTROPHILS 90 (H) 32 - 75 %    LYMPHOCYTES 7 (L) 12 - 49 %    MONOCYTES 2 (L) 5 - 13 %    EOSINOPHILS 0 0 - 7 %    BASOPHILS 0 0 - 1 %    IMMATURE GRANULOCYTES 1 (H) 0 - 0.5 %    ABS. NEUTROPHILS 8.5 (H) 1.8 - 8.0 K/UL    ABS. LYMPHOCYTES 0.6 (L) 0.8 - 3.5 K/UL    ABS. MONOCYTES 0.2 0.0 - 1.0 K/UL    ABS. EOSINOPHILS 0.0 0.0 - 0.4 K/UL    ABS. BASOPHILS 0.0 0.0 - 0.1 K/UL    ABS. IMM. GRANS. 0.1 (H) 0.00 - 0.04 K/UL    DF AUTOMATED     METABOLIC PANEL, BASIC    Collection Time: 05/22/22 12:47 PM   Result Value Ref Range    Sodium 139 136 - 145 mmol/L    Potassium 3.8 3.5 - 5.1 mmol/L    Chloride 110 (H) 97 - 108 mmol/L    CO2 20 (L) 21 - 32 mmol/L    Anion gap 9 5 - 15 mmol/L    Glucose 216 (H) 65 - 100 mg/dL    BUN 10 6 - 20 mg/dL    Creatinine 1.02 0.70 - 1.30 mg/dL    BUN/Creatinine ratio 10 (L) 12 - 20      GFR est AA >60 >60 ml/min/1.73m2    GFR est non-AA >60 >60 ml/min/1.73m2    Calcium 8.5 8.5 - 10.1 mg/dL       Results     ** No results found for the last 336 hours.  **           Labs:     Recent Labs     05/22/22  0301 05/21/22  0654   WBC 9.3 6.9   HGB 7.2* 6.8*   HCT 21.0* 20.4*    190     Recent Labs     05/22/22  1247 05/21/22  0654 05/20/22  0015    140 138   K 3.8 4.1 4.3   * 111* 104   CO2 20* 27 26   BUN 10 17 42*   CREA 1.02 0.67* 1.10   * 88 152*   CA 8.5 8.0* 8.4*     Recent Labs     05/21/22  0654 05/20/22  0015   ALT 17 27   AP 68 85   TBILI 0.4 0.3   TP 4.9* 6.3*   ALB 2.7* 3.1*   GLOB 2.2 3.2     Recent Labs     05/20/22  0015   INR 1.0   PTP 9.9   APTT 21.8*      No results for input(s): FE, TIBC, PSAT, FERR in the last 72 hours. No results found for: FOL, RBCF   No results for input(s): PH, PCO2, PO2 in the last 72 hours. No results for input(s): CPK, CKNDX, TROIQ in the last 72 hours.     No lab exists for component: CPKMB  No results found for: CHOL, CHOLX, CHLST, CHOLV, HDL, HDLP, LDL, LDLC, DLDLP, TGLX, TRIGL, TRIGP, CHHD, CHHDX  No results found for: GLUCPOC  No results found for: COLOR, APPRN, SPGRU, REFSG, LASHELL, PROTU, GLUCU, KETU, BILU, UROU, PEDRO, LEUKU, GLUKE, EPSU, BACTU, WBCU, RBCU, CASTS, UCRY      Assessment:     Acute hypoxic respiratory failure 2 L  GI bleed   COPD  Tobacco use  Hyperlipidemia  Acute blood loss anemia      Plan:     Transfuse 1 unit packed RBC  Monitor H&H  Start on nebulizer treatment IV Solu-Medrol      For colonoscopy tomorrow  Repeat the labs      Current Facility-Administered Medications:     0.9% sodium chloride infusion 250 mL, 250 mL, IntraVENous, PRN, Jena Johansen MD    albuterol-ipratropium (DUO-NEB) 2.5 MG-0.5 MG/3 ML, 3 mL, Nebulization, Q6H RT, Jena Johansen MD, 3 mL at 05/22/22 0739    budesonide-formoteroL (SYMBICORT) 160-4.5 mcg/actuation HFA inhaler 2 Puff, 2 Puff, Inhalation, BID, Jena Johansen MD, 2 Puff at 05/22/22 0739    methylPREDNISolone (PF) (SOLU-MEDROL) injection 40 mg, 40 mg, IntraVENous, Q6H, Jena Johansen MD, 40 mg at 05/22/22 1202    traMADoL (ULTRAM) tablet 50 mg, 50 mg, Oral, Q6H PRN, Eleno Srinath Gardner MD, 50 mg at 05/21/22 1718    atorvastatin (LIPITOR) tablet 40 mg, 40 mg, Oral, DAILY, Jena Johansen MD, 40 mg at 05/22/22 0815    0.9% sodium chloride infusion, 75 mL/hr, IntraVENous, CONTINUOUS, Jena Johansen MD, Last Rate: 75 mL/hr at 05/22/22 1208, 75 mL/hr at 05/22/22 1208    acetaminophen (TYLENOL) tablet 650 mg, 650 mg, Oral, Q6H PRN, 650 mg at 05/21/22 0945 **OR** acetaminophen (TYLENOL) suppository 650 mg, 650 mg, Rectal, Q6H PRN, Srinath Johansen MD    polyethylene glycol (MIRALAX) packet 17 g, 17 g, Oral, DAILY PRN, Srinath Johansen MD    ondansetron (ZOFRAN ODT) tablet 4 mg, 4 mg, Oral, Q8H PRN **OR** ondansetron (ZOFRAN) injection 4 mg, 4 mg, IntraVENous, Q6H PRN, Jena Johansen MD    pantoprazole (PROTONIX) 40 mg in 0.9% sodium chloride 10 mL injection, 40 mg, IntraVENous, Q12H, Dougherty, Barb F, NP, 40 mg at 05/22/22 0815    sodium chloride (NS) flush 5-40 mL, 5-40 mL, IntraVENous, Q8H, Dougherty, Barb F, NP, 10 mL at 05/22/22 0644    sodium chloride (NS) flush 5-40 mL, 5-40 mL, IntraVENous, PRN, Sue Lipps F, NP    peg 3350-electrolytes (COLYTE) 4000 mL, 4,000 mL, Oral, ONCE, Dougherty, Josef Pu, NP

## 2022-05-23 ENCOUNTER — ANESTHESIA EVENT (OUTPATIENT)
Dept: ENDOSCOPY | Age: 77
DRG: 377 | End: 2022-05-23
Payer: MEDICARE

## 2022-05-23 ENCOUNTER — APPOINTMENT (OUTPATIENT)
Dept: ENDOSCOPY | Age: 77
DRG: 377 | End: 2022-05-23
Attending: INTERNAL MEDICINE
Payer: MEDICARE

## 2022-05-23 ENCOUNTER — ANESTHESIA (OUTPATIENT)
Dept: ENDOSCOPY | Age: 77
DRG: 377 | End: 2022-05-23
Payer: MEDICARE

## 2022-05-23 LAB
ALBUMIN SERPL-MCNC: 2.8 G/DL (ref 3.5–5)
ALBUMIN/GLOB SERPL: 1.3 {RATIO} (ref 1.1–2.2)
ALP SERPL-CCNC: 65 U/L (ref 45–117)
ALT SERPL-CCNC: 24 U/L (ref 12–78)
ANION GAP SERPL CALC-SCNC: 4 MMOL/L (ref 5–15)
AST SERPL W P-5'-P-CCNC: 51 U/L (ref 15–37)
ATRIAL RATE: 92 BPM
BILIRUB SERPL-MCNC: 0.3 MG/DL (ref 0.2–1)
BUN SERPL-MCNC: 11 MG/DL (ref 6–20)
BUN/CREAT SERPL: 18 (ref 12–20)
CA-I BLD-MCNC: 8.3 MG/DL (ref 8.5–10.1)
CALCULATED P AXIS, ECG09: 80 DEGREES
CALCULATED R AXIS, ECG10: 87 DEGREES
CALCULATED T AXIS, ECG11: 86 DEGREES
CHLORIDE SERPL-SCNC: 113 MMOL/L (ref 97–108)
CO2 SERPL-SCNC: 27 MMOL/L (ref 21–32)
CREAT SERPL-MCNC: 0.6 MG/DL (ref 0.7–1.3)
DIAGNOSIS, 93000: NORMAL
ERYTHROCYTE [DISTWIDTH] IN BLOOD BY AUTOMATED COUNT: 14.3 % (ref 11.5–14.5)
GLOBULIN SER CALC-MCNC: 2.2 G/DL (ref 2–4)
GLUCOSE SERPL-MCNC: 129 MG/DL (ref 65–100)
HCT VFR BLD AUTO: 19.9 % (ref 36.6–50.3)
HGB BLD-MCNC: 6.8 G/DL (ref 12.1–17)
MCH RBC QN AUTO: 32.1 PG (ref 26–34)
MCHC RBC AUTO-ENTMCNC: 34.2 G/DL (ref 30–36.5)
MCV RBC AUTO: 93.9 FL (ref 80–99)
NRBC # BLD: 0 K/UL (ref 0–0.01)
NRBC BLD-RTO: 0 PER 100 WBC
P-R INTERVAL, ECG05: 156 MS
PLATELET # BLD AUTO: 207 K/UL (ref 150–400)
PMV BLD AUTO: 9.7 FL (ref 8.9–12.9)
POTASSIUM SERPL-SCNC: 3.6 MMOL/L (ref 3.5–5.1)
PROT SERPL-MCNC: 5 G/DL (ref 6.4–8.2)
Q-T INTERVAL, ECG07: 354 MS
QRS DURATION, ECG06: 90 MS
QTC CALCULATION (BEZET), ECG08: 437 MS
RBC # BLD AUTO: 2.12 M/UL (ref 4.1–5.7)
SODIUM SERPL-SCNC: 144 MMOL/L (ref 136–145)
VENTRICULAR RATE, ECG03: 92 BPM
WBC # BLD AUTO: 13.3 K/UL (ref 4.1–11.1)

## 2022-05-23 PROCEDURE — 74011000250 HC RX REV CODE- 250: Performed by: NURSE PRACTITIONER

## 2022-05-23 PROCEDURE — P9016 RBC LEUKOCYTES REDUCED: HCPCS

## 2022-05-23 PROCEDURE — 80053 COMPREHEN METABOLIC PANEL: CPT

## 2022-05-23 PROCEDURE — 74011250636 HC RX REV CODE- 250/636: Performed by: FAMILY MEDICINE

## 2022-05-23 PROCEDURE — 74011250637 HC RX REV CODE- 250/637: Performed by: FAMILY MEDICINE

## 2022-05-23 PROCEDURE — C9113 INJ PANTOPRAZOLE SODIUM, VIA: HCPCS | Performed by: NURSE PRACTITIONER

## 2022-05-23 PROCEDURE — 74011250636 HC RX REV CODE- 250/636

## 2022-05-23 PROCEDURE — 74011000250 HC RX REV CODE- 250

## 2022-05-23 PROCEDURE — 2709999900 HC NON-CHARGEABLE SUPPLY: Performed by: INTERNAL MEDICINE

## 2022-05-23 PROCEDURE — 74011000250 HC RX REV CODE- 250: Performed by: FAMILY MEDICINE

## 2022-05-23 PROCEDURE — 65270000029 HC RM PRIVATE

## 2022-05-23 PROCEDURE — 36430 TRANSFUSION BLD/BLD COMPNT: CPT

## 2022-05-23 PROCEDURE — 36415 COLL VENOUS BLD VENIPUNCTURE: CPT

## 2022-05-23 PROCEDURE — 74011250636 HC RX REV CODE- 250/636: Performed by: NURSE PRACTITIONER

## 2022-05-23 PROCEDURE — 94640 AIRWAY INHALATION TREATMENT: CPT

## 2022-05-23 PROCEDURE — 85027 COMPLETE CBC AUTOMATED: CPT

## 2022-05-23 PROCEDURE — 0DJD8ZZ INSPECTION OF LOWER INTESTINAL TRACT, VIA NATURAL OR ARTIFICIAL OPENING ENDOSCOPIC: ICD-10-PCS | Performed by: INTERNAL MEDICINE

## 2022-05-23 PROCEDURE — 76040000007: Performed by: INTERNAL MEDICINE

## 2022-05-23 PROCEDURE — 76060000032 HC ANESTHESIA 0.5 TO 1 HR: Performed by: INTERNAL MEDICINE

## 2022-05-23 PROCEDURE — 30233N1 TRANSFUSION OF NONAUTOLOGOUS RED BLOOD CELLS INTO PERIPHERAL VEIN, PERCUTANEOUS APPROACH: ICD-10-PCS | Performed by: FAMILY MEDICINE

## 2022-05-23 RX ORDER — SODIUM CHLORIDE 9 MG/ML
250 INJECTION, SOLUTION INTRAVENOUS AS NEEDED
Status: DISCONTINUED | OUTPATIENT
Start: 2022-05-23 | End: 2022-05-24 | Stop reason: HOSPADM

## 2022-05-23 RX ORDER — IPRATROPIUM BROMIDE AND ALBUTEROL SULFATE 2.5; .5 MG/3ML; MG/3ML
3 SOLUTION RESPIRATORY (INHALATION)
Status: DISCONTINUED | OUTPATIENT
Start: 2022-05-23 | End: 2022-05-24 | Stop reason: HOSPADM

## 2022-05-23 RX ORDER — LIDOCAINE HYDROCHLORIDE 20 MG/ML
INJECTION, SOLUTION EPIDURAL; INFILTRATION; INTRACAUDAL; PERINEURAL AS NEEDED
Status: DISCONTINUED | OUTPATIENT
Start: 2022-05-23 | End: 2022-05-23 | Stop reason: HOSPADM

## 2022-05-23 RX ORDER — PROPOFOL 10 MG/ML
INJECTION, EMULSION INTRAVENOUS AS NEEDED
Status: DISCONTINUED | OUTPATIENT
Start: 2022-05-23 | End: 2022-05-23 | Stop reason: HOSPADM

## 2022-05-23 RX ORDER — SODIUM CHLORIDE, SODIUM LACTATE, POTASSIUM CHLORIDE, CALCIUM CHLORIDE 600; 310; 30; 20 MG/100ML; MG/100ML; MG/100ML; MG/100ML
INJECTION, SOLUTION INTRAVENOUS
Status: DISCONTINUED | OUTPATIENT
Start: 2022-05-23 | End: 2022-05-23 | Stop reason: HOSPADM

## 2022-05-23 RX ADMIN — LIDOCAINE HYDROCHLORIDE 40 MG: 20 INJECTION, SOLUTION EPIDURAL; INFILTRATION; INTRACAUDAL; PERINEURAL at 09:21

## 2022-05-23 RX ADMIN — SODIUM CHLORIDE, POTASSIUM CHLORIDE, SODIUM LACTATE AND CALCIUM CHLORIDE: 600; 310; 30; 20 INJECTION, SOLUTION INTRAVENOUS at 09:20

## 2022-05-23 RX ADMIN — PROPOFOL 50 MG: 10 INJECTION, EMULSION INTRAVENOUS at 09:31

## 2022-05-23 RX ADMIN — IPRATROPIUM BROMIDE AND ALBUTEROL SULFATE 3 ML: .5; 3 SOLUTION RESPIRATORY (INHALATION) at 01:44

## 2022-05-23 RX ADMIN — BUDESONIDE AND FORMOTEROL FUMARATE DIHYDRATE 2 PUFF: 160; 4.5 AEROSOL RESPIRATORY (INHALATION) at 07:52

## 2022-05-23 RX ADMIN — SODIUM CHLORIDE 75 ML/HR: 9 INJECTION, SOLUTION INTRAVENOUS at 21:10

## 2022-05-23 RX ADMIN — BUDESONIDE AND FORMOTEROL FUMARATE DIHYDRATE 2 PUFF: 160; 4.5 AEROSOL RESPIRATORY (INHALATION) at 20:56

## 2022-05-23 RX ADMIN — PROPOFOL 50 MG: 10 INJECTION, EMULSION INTRAVENOUS at 09:24

## 2022-05-23 RX ADMIN — SODIUM CHLORIDE, PRESERVATIVE FREE 10 ML: 5 INJECTION INTRAVENOUS at 16:39

## 2022-05-23 RX ADMIN — METHYLPREDNISOLONE SODIUM SUCCINATE 40 MG: 40 INJECTION, POWDER, FOR SOLUTION INTRAMUSCULAR; INTRAVENOUS at 00:20

## 2022-05-23 RX ADMIN — SODIUM CHLORIDE: 9 INJECTION, SOLUTION INTRAVENOUS at 09:20

## 2022-05-23 RX ADMIN — SODIUM CHLORIDE, PRESERVATIVE FREE 40 MG: 5 INJECTION INTRAVENOUS at 21:10

## 2022-05-23 RX ADMIN — METHYLPREDNISOLONE SODIUM SUCCINATE 40 MG: 40 INJECTION, POWDER, FOR SOLUTION INTRAMUSCULAR; INTRAVENOUS at 05:40

## 2022-05-23 RX ADMIN — SODIUM CHLORIDE, PRESERVATIVE FREE 10 ML: 5 INJECTION INTRAVENOUS at 21:14

## 2022-05-23 RX ADMIN — SODIUM CHLORIDE 75 ML/HR: 9 INJECTION, SOLUTION INTRAVENOUS at 00:21

## 2022-05-23 RX ADMIN — PROPOFOL 50 MG: 10 INJECTION, EMULSION INTRAVENOUS at 09:21

## 2022-05-23 RX ADMIN — SODIUM CHLORIDE, PRESERVATIVE FREE 10 ML: 5 INJECTION INTRAVENOUS at 05:40

## 2022-05-23 NOTE — ANESTHESIA PREPROCEDURE EVALUATION
Relevant Problems   No relevant active problems       Anesthetic History   No history of anesthetic complications            Review of Systems / Medical History  Patient summary reviewed, nursing notes reviewed and pertinent labs reviewed    Pulmonary    COPD      Smoker         Neuro/Psych   Within defined limits           Cardiovascular              Hyperlipidemia         GI/Hepatic/Renal  Within defined limits              Endo/Other        Anemia     Other Findings   Comments: Will give PRBC x2 now       Past Medical History:   Diagnosis Date    Asbestos exposure     Chronic obstructive pulmonary disease (HCC)     GI bleed     High cholesterol        History reviewed. No pertinent surgical history.     Current Outpatient Medications   Medication Instructions    atorvastatin (LIPITOR) 40 mg, Oral, DAILY       Current Facility-Administered Medications   Medication Dose Route Frequency    albuterol-ipratropium (DUO-NEB) 2.5 MG-0.5 MG/3 ML  3 mL Nebulization Q6H PRN    0.9% sodium chloride infusion 250 mL  250 mL IntraVENous PRN    budesonide-formoteroL (SYMBICORT) 160-4.5 mcg/actuation HFA inhaler 2 Puff  2 Puff Inhalation BID    methylPREDNISolone (PF) (SOLU-MEDROL) injection 40 mg  40 mg IntraVENous Q6H    traMADoL (ULTRAM) tablet 50 mg  50 mg Oral Q6H PRN    atorvastatin (LIPITOR) tablet 40 mg  40 mg Oral DAILY    0.9% sodium chloride infusion  75 mL/hr IntraVENous CONTINUOUS    acetaminophen (TYLENOL) tablet 650 mg  650 mg Oral Q6H PRN    Or    acetaminophen (TYLENOL) suppository 650 mg  650 mg Rectal Q6H PRN    polyethylene glycol (MIRALAX) packet 17 g  17 g Oral DAILY PRN    ondansetron (ZOFRAN ODT) tablet 4 mg  4 mg Oral Q8H PRN    Or    ondansetron (ZOFRAN) injection 4 mg  4 mg IntraVENous Q6H PRN    pantoprazole (PROTONIX) 40 mg in 0.9% sodium chloride 10 mL injection  40 mg IntraVENous Q12H    sodium chloride (NS) flush 5-40 mL  5-40 mL IntraVENous Q8H    sodium chloride (NS) flush 5-40 mL  5-40 mL IntraVENous PRN       Patient Vitals for the past 24 hrs:   Temp Pulse Resp BP SpO2   05/23/22 0725 36.6 °C (97.8 °F) 76 17 121/67 96 %   05/23/22 0145 -- -- -- -- 99 %   05/23/22 0029 36.4 °C (97.6 °F) 92 16 130/80 97 %   05/22/22 2125 36.6 °C (97.9 °F) 94 19 (!) 129/58 99 %   05/22/22 2014 -- -- -- -- 95 %   05/22/22 1516 36.6 °C (97.9 °F) 95 18 131/66 96 %   05/22/22 0915 36.7 °C (98.1 °F) 90 18 133/64 (!) 82 %       Lab Results   Component Value Date/Time    WBC 13.3 (H) 05/23/2022 07:50 AM    HGB 6.8 (L) 05/23/2022 07:50 AM    HCT 19.9 (L) 05/23/2022 07:50 AM    PLATELET 623 76/91/6404 07:50 AM    MCV 93.9 05/23/2022 07:50 AM     Lab Results   Component Value Date/Time    Sodium 144 05/23/2022 07:50 AM    Potassium 3.6 05/23/2022 07:50 AM    Chloride 113 (H) 05/23/2022 07:50 AM    CO2 27 05/23/2022 07:50 AM    Anion gap 4 (L) 05/23/2022 07:50 AM    Glucose 129 (H) 05/23/2022 07:50 AM    BUN 11 05/23/2022 07:50 AM    Creatinine 0.60 (L) 05/23/2022 07:50 AM    BUN/Creatinine ratio 18 05/23/2022 07:50 AM    GFR est AA >60 05/23/2022 07:50 AM    GFR est non-AA >60 05/23/2022 07:50 AM    Calcium 8.3 (L) 05/23/2022 07:50 AM     No results found for: APTT, PTP, INR, INREXT  Lab Results   Component Value Date/Time    Glucose 129 (H) 05/23/2022 07:50 AM     Physical Exam    Airway  Mallampati: II  TM Distance: 4 - 6 cm  Neck ROM: normal range of motion   Mouth opening: Normal     Cardiovascular    Rhythm: regular  Rate: normal         Dental    Dentition: Poor dentition     Pulmonary  Breath sounds clear to auscultation               Abdominal  GI exam deferred       Other Findings            Anesthetic Plan    ASA: 3  Anesthesia type: total IV anesthesia and MAC          Induction: Intravenous  Anesthetic plan and risks discussed with: Patient and Family

## 2022-05-23 NOTE — PROGRESS NOTES
Problem: Falls - Risk of  Goal: *Absence of Falls  Description: Document Kenny Mandes Fall Risk and appropriate interventions in the flowsheet.   Outcome: Progressing Towards Goal  Note: Fall Risk Interventions:            Medication Interventions: Teach patient to arise slowly                   Problem: Patient Education: Go to Patient Education Activity  Goal: Patient/Family Education  Outcome: Progressing Towards Goal

## 2022-05-23 NOTE — PROGRESS NOTES
General Daily Progress Note          Patient Name:   Brett Hendricks       YOB: 1945       Age:  78 y.o. Admit Date: 5/19/2022      Subjective:     Patient is a 78y.o. year old male with a PMHx significant for COPD, hypercholesteremia, tobacco use, prior lower GI bleed 20 years ago, who presented to the ED for rectal bleeding for the past day. Patient states that around 2pm yesterday he began having dark tarry stools which transitioned to bright red stools, he had 7 episodes of bloody stools yesterday. He has associated mid epigastric pain that is worse with movement and chills Per wife, patient had a similar episode of GI bleeding 20 years ago and had part of his colon resected. Patient denies any sick contacts, new medication changes, new foods or recent travel. He denies fever, chest pain, SOB, nausea, vomiting, headache. He is a current tobacco smoker and reports smoking 3 cigarettes daily.      ER workup showed initial Hgb was 10.6 which dropped down to 8.0 after six hours. Patient was seen by the ER physician and admitted for further evaluation and treatment.      Hemoglobin dropped to 6.8    5/23  Hgb still 6.8  WBC of 13.3      Going for colonoscopy today      Objective:     Visit Vitals  /68   Pulse 75   Temp 97.2 °F (36.2 °C)   Resp 16   Ht 5' 10\" (1.778 m)   Wt 62.1 kg (137 lb)   SpO2 96%   BMI 19.66 kg/m²        Recent Results (from the past 24 hour(s))   CBC WITH AUTOMATED DIFF    Collection Time: 05/22/22 12:47 PM   Result Value Ref Range    WBC 9.3 4.1 - 11.1 K/uL    RBC 2.22 (L) 4.10 - 5.70 M/uL    HGB 7.2 (L) 12.1 - 17.0 g/dL    HCT 21.0 (L) 36.6 - 50.3 %    MCV 94.6 80.0 - 99.0 FL    MCH 32.4 26.0 - 34.0 PG    MCHC 34.3 30.0 - 36.5 g/dL    RDW 14.3 11.5 - 14.5 %    PLATELET 486 865 - 415 K/uL    MPV 9.7 8.9 - 12.9 FL    NRBC 0.0 0.0  WBC    ABSOLUTE NRBC 0.00 0.00 - 0.01 K/uL    NEUTROPHILS 90 (H) 32 - 75 %    LYMPHOCYTES 7 (L) 12 - 49 %    MONOCYTES 2 (L) 5 - 13 % EOSINOPHILS 0 0 - 7 %    BASOPHILS 0 0 - 1 %    IMMATURE GRANULOCYTES 1 (H) 0 - 0.5 %    ABS. NEUTROPHILS 8.5 (H) 1.8 - 8.0 K/UL    ABS. LYMPHOCYTES 0.6 (L) 0.8 - 3.5 K/UL    ABS. MONOCYTES 0.2 0.0 - 1.0 K/UL    ABS. EOSINOPHILS 0.0 0.0 - 0.4 K/UL    ABS. BASOPHILS 0.0 0.0 - 0.1 K/UL    ABS. IMM. GRANS. 0.1 (H) 0.00 - 0.04 K/UL    DF AUTOMATED     METABOLIC PANEL, BASIC    Collection Time: 05/22/22 12:47 PM   Result Value Ref Range    Sodium 139 136 - 145 mmol/L    Potassium 3.8 3.5 - 5.1 mmol/L    Chloride 110 (H) 97 - 108 mmol/L    CO2 20 (L) 21 - 32 mmol/L    Anion gap 9 5 - 15 mmol/L    Glucose 216 (H) 65 - 100 mg/dL    BUN 10 6 - 20 mg/dL    Creatinine 1.02 0.70 - 1.30 mg/dL    BUN/Creatinine ratio 10 (L) 12 - 20      GFR est AA >60 >60 ml/min/1.73m2    GFR est non-AA >60 >60 ml/min/1.73m2    Calcium 8.5 8.5 - 10.1 mg/dL   CBC W/O DIFF    Collection Time: 05/23/22  7:50 AM   Result Value Ref Range    WBC 13.3 (H) 4.1 - 11.1 K/uL    RBC 2.12 (L) 4.10 - 5.70 M/uL    HGB 6.8 (L) 12.1 - 17.0 g/dL    HCT 19.9 (L) 36.6 - 50.3 %    MCV 93.9 80.0 - 99.0 FL    MCH 32.1 26.0 - 34.0 PG    MCHC 34.2 30.0 - 36.5 g/dL    RDW 14.3 11.5 - 14.5 %    PLATELET 603 951 - 802 K/uL    MPV 9.7 8.9 - 12.9 FL    NRBC 0.0 0.0  WBC    ABSOLUTE NRBC 0.00 0.00 - 1.19 K/uL   METABOLIC PANEL, COMPREHENSIVE    Collection Time: 05/23/22  7:50 AM   Result Value Ref Range    Sodium 144 136 - 145 mmol/L    Potassium 3.6 3.5 - 5.1 mmol/L    Chloride 113 (H) 97 - 108 mmol/L    CO2 27 21 - 32 mmol/L    Anion gap 4 (L) 5 - 15 mmol/L    Glucose 129 (H) 65 - 100 mg/dL    BUN 11 6 - 20 mg/dL    Creatinine 0.60 (L) 0.70 - 1.30 mg/dL    BUN/Creatinine ratio 18 12 - 20      GFR est AA >60 >60 ml/min/1.73m2    GFR est non-AA >60 >60 ml/min/1.73m2    Calcium 8.3 (L) 8.5 - 10.1 mg/dL    Bilirubin, total 0.3 0.2 - 1.0 mg/dL    AST (SGOT) 51 (H) 15 - 37 U/L    ALT (SGPT) 24 12 - 78 U/L    Alk.  phosphatase 65 45 - 117 U/L    Protein, total 5.0 (L) 6.4 - 8.2 g/dL    Albumin 2.8 (L) 3.5 - 5.0 g/dL    Globulin 2.2 2.0 - 4.0 g/dL    A-G Ratio 1.3 1.1 - 2.2       [unfilled]      Review of Systems    Constitutional: Negative for chills and fever. HENT: Negative. Eyes: Negative. Respiratory: Negative. Cardiovascular: Negative. Gastrointestinal: Negative for abdominal pain and nausea. Skin: Negative. Neurological: Negative. Physical Exam:      Constitutional: pt is oriented to person, place, and time. HENT:   Head: Normocephalic and atraumatic. Eyes: Pupils are equal, round, and reactive to light. EOM are normal.   Cardiovascular: Normal rate, regular rhythm and normal heart sounds. Pulmonary/Chest: Breath sounds normal. No wheezes. No rales. Exhibits no tenderness. Abdominal: Soft. Bowel sounds are normal. There is no abdominal tenderness. There is no rebound and no guarding. Musculoskeletal: Normal range of motion. Neurological: pt is alert and oriented to person, place, and time. XR CHEST PORT   Final Result      2 portable AP views. No prior exams. Monitoring equipment overlies the body. Narrow heart. No mediastinal widening or shift. Hyperexpanded lungs consistent   with COPD. Right perihilar calcifications. No spiculated lung mass. Mild   scarring in the upper zones. Suspect calcified pleural plaques on the right. No consolidation. Negative for pulmonary edema, pleural effusion or pneumothorax. No acute fractures. Negative for subdiaphragmatic free air. CTA ABD PELV W WO CONT   Final Result   No evidence of active bleeding or demonstration of bleeding source.    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           Recent Results (from the past 24 hour(s))   CBC WITH AUTOMATED DIFF    Collection Time: 05/22/22 12:47 PM   Result Value Ref Range    WBC 9.3 4.1 - 11.1 K/uL    RBC 2.22 (L) 4.10 - 5.70 M/uL    HGB 7.2 (L) 12.1 - 17.0 g/dL    HCT 21.0 (L) 36.6 - 50.3 %    MCV 94.6 80.0 - 99.0 FL    MCH 32.4 26.0 - 34.0 PG    MCHC 34.3 30.0 - 36.5 g/dL    RDW 14.3 11.5 - 14.5 %    PLATELET 186 262 - 277 K/uL    MPV 9.7 8.9 - 12.9 FL    NRBC 0.0 0.0  WBC    ABSOLUTE NRBC 0.00 0.00 - 0.01 K/uL    NEUTROPHILS 90 (H) 32 - 75 %    LYMPHOCYTES 7 (L) 12 - 49 %    MONOCYTES 2 (L) 5 - 13 %    EOSINOPHILS 0 0 - 7 %    BASOPHILS 0 0 - 1 %    IMMATURE GRANULOCYTES 1 (H) 0 - 0.5 %    ABS. NEUTROPHILS 8.5 (H) 1.8 - 8.0 K/UL    ABS. LYMPHOCYTES 0.6 (L) 0.8 - 3.5 K/UL    ABS. MONOCYTES 0.2 0.0 - 1.0 K/UL    ABS. EOSINOPHILS 0.0 0.0 - 0.4 K/UL    ABS. BASOPHILS 0.0 0.0 - 0.1 K/UL    ABS. IMM.  GRANS. 0.1 (H) 0.00 - 0.04 K/UL    DF AUTOMATED     METABOLIC PANEL, BASIC    Collection Time: 05/22/22 12:47 PM   Result Value Ref Range    Sodium 139 136 - 145 mmol/L    Potassium 3.8 3.5 - 5.1 mmol/L    Chloride 110 (H) 97 - 108 mmol/L    CO2 20 (L) 21 - 32 mmol/L    Anion gap 9 5 - 15 mmol/L    Glucose 216 (H) 65 - 100 mg/dL    BUN 10 6 - 20 mg/dL    Creatinine 1.02 0.70 - 1.30 mg/dL    BUN/Creatinine ratio 10 (L) 12 - 20      GFR est AA >60 >60 ml/min/1.73m2    GFR est non-AA >60 >60 ml/min/1.73m2    Calcium 8.5 8.5 - 10.1 mg/dL   CBC W/O DIFF    Collection Time: 05/23/22  7:50 AM   Result Value Ref Range    WBC 13.3 (H) 4.1 - 11.1 K/uL    RBC 2.12 (L) 4.10 - 5.70 M/uL    HGB 6.8 (L) 12.1 - 17.0 g/dL    HCT 19.9 (L) 36.6 - 50.3 %    MCV 93.9 80.0 - 99.0 FL    MCH 32.1 26.0 - 34.0 PG    MCHC 34.2 30.0 - 36.5 g/dL    RDW 14.3 11.5 - 14.5 %    PLATELET 277 939 - 585 K/uL    MPV 9.7 8.9 - 12.9 FL    NRBC 0.0 0.0  WBC    ABSOLUTE NRBC 0.00 0.00 - 4.40 K/uL   METABOLIC PANEL, COMPREHENSIVE    Collection Time: 05/23/22  7:50 AM   Result Value Ref Range    Sodium 144 136 - 145 mmol/L    Potassium 3.6 3.5 - 5.1 mmol/L    Chloride 113 (H) 97 - 108 mmol/L    CO2 27 21 - 32 mmol/L    Anion gap 4 (L) 5 - 15 mmol/L    Glucose 129 (H) 65 - 100 mg/dL    BUN 11 6 - 20 mg/dL    Creatinine 0.60 (L) 0.70 - 1.30 mg/dL BUN/Creatinine ratio 18 12 - 20      GFR est AA >60 >60 ml/min/1.73m2    GFR est non-AA >60 >60 ml/min/1.73m2    Calcium 8.3 (L) 8.5 - 10.1 mg/dL    Bilirubin, total 0.3 0.2 - 1.0 mg/dL    AST (SGOT) 51 (H) 15 - 37 U/L    ALT (SGPT) 24 12 - 78 U/L    Alk. phosphatase 65 45 - 117 U/L    Protein, total 5.0 (L) 6.4 - 8.2 g/dL    Albumin 2.8 (L) 3.5 - 5.0 g/dL    Globulin 2.2 2.0 - 4.0 g/dL    A-G Ratio 1.3 1.1 - 2.2         Results     ** No results found for the last 336 hours. **           Labs:     Recent Labs     05/23/22  0750 05/22/22  1247   WBC 13.3* 9.3   HGB 6.8* 7.2*   HCT 19.9* 21.0*    192     Recent Labs     05/23/22  0750 05/22/22  1247 05/21/22  0654    139 140   K 3.6 3.8 4.1   * 110* 111*   CO2 27 20* 27   BUN 11 10 17   CREA 0.60* 1.02 0.67*   * 216* 88   CA 8.3* 8.5 8.0*     Recent Labs     05/23/22  0750 05/21/22  0654   ALT 24 17   AP 65 68   TBILI 0.3 0.4   TP 5.0* 4.9*   ALB 2.8* 2.7*   GLOB 2.2 2.2     No results for input(s): INR, PTP, APTT, INREXT, INREXT in the last 72 hours. No results for input(s): FE, TIBC, PSAT, FERR in the last 72 hours. No results found for: FOL, RBCF   No results for input(s): PH, PCO2, PO2 in the last 72 hours. No results for input(s): CPK, CKNDX, TROIQ in the last 72 hours. No lab exists for component: CPKMB  No results found for: CHOL, CHOLX, CHLST, CHOLV, HDL, HDLP, LDL, LDLC, DLDLP, TGLX, TRIGL, TRIGP, CHHD, CHHDX  No results found for: GLUCPOC  No results found for: COLOR, APPRN, SPGRU, REFSG, LASHELL, PROTU, GLUCU, KETU, BILU, UROU, PEDRO, LEUKU, GLUKE, EPSU, BACTU, WBCU, RBCU, CASTS, UCRY      Assessment:     Acute hypoxic respiratory failure 2 L  GI bleed   COPD  Tobacco use  Hyperlipidemia  Acute blood loss anemia      Plan:     Transfuse 1 unit packed RBC  Monitor H&H  Start on nebulizer treatment IV Solu-Medrol    Patient away for colonoscopy.      Current Facility-Administered Medications:     albuterol-ipratropium (DUO-NEB) 2.5 MG-0.5 MG/3 ML, 3 mL, Nebulization, Q6H PRN, Jena Johansen MD    0.9% sodium chloride infusion 250 mL, 250 mL, IntraVENous, PRN, Araceli Jolly MD    0.9% sodium chloride infusion 250 mL, 250 mL, IntraVENous, PRN, Sally Johansen MD    budesonide-formoteroL [facility]) 160-4.5 mcg/actuation HFA inhaler 2 Puff, 2 Puff, Inhalation, BID, Jena Johansen MD, 2 Puff at 05/23/22 0752    methylPREDNISolone (PF) (SOLU-MEDROL) injection 40 mg, 40 mg, IntraVENous, Q6H, Jena Johansen MD, 40 mg at 05/23/22 0540    traMADoL (ULTRAM) tablet 50 mg, 50 mg, Oral, Q6H PRN, Jena Johansen MD, 50 mg at 05/21/22 1718    atorvastatin (LIPITOR) tablet 40 mg, 40 mg, Oral, DAILY, Jena Johansen MD, 40 mg at 05/22/22 0815    0.9% sodium chloride infusion, 75 mL/hr, IntraVENous, CONTINUOUS, Jena Johansen MD, Last Rate: 75 mL/hr at 05/23/22 0920, New Bag at 05/23/22 0920    acetaminophen (TYLENOL) tablet 650 mg, 650 mg, Oral, Q6H PRN, 650 mg at 05/21/22 0945 **OR** acetaminophen (TYLENOL) suppository 650 mg, 650 mg, Rectal, Q6H PRN, Sally Johansen MD    polyethylene glycol (MIRALAX) packet 17 g, 17 g, Oral, DAILY PRN, Sally Johansen MD    ondansetron (ZOFRAN ODT) tablet 4 mg, 4 mg, Oral, Q8H PRN **OR** ondansetron (ZOFRAN) injection 4 mg, 4 mg, IntraVENous, Q6H PRN, Jena Johansen MD    pantoprazole (PROTONIX) 40 mg in 0.9% sodium chloride 10 mL injection, 40 mg, IntraVENous, Q12H, Nayana Commons F, NP, 40 mg at 05/22/22 2127    sodium chloride (NS) flush 5-40 mL, 5-40 mL, IntraVENous, Q8H, Barb Dougherty F, NP, 10 mL at 05/23/22 0540    sodium chloride (NS) flush 5-40 mL, 5-40 mL, IntraVENous, PRN, Ova Caryn, NP

## 2022-05-23 NOTE — PROGRESS NOTES
General Daily Progress Note          Patient Name:   Virgie Blanco       YOB: 1945       Age:  78 y.o. Admit Date: 5/19/2022      Subjective:     Patient is a 78y.o. year old male with a PMHx significant for COPD, hypercholesteremia, tobacco use, prior lower GI bleed 20 years ago, who presented to the ED for rectal bleeding for the past day. Patient states that around 2pm yesterday he began having dark tarry stools which transitioned to bright red stools, he had 7 episodes of bloody stools yesterday. He has associated mid epigastric pain that is worse with movement and chills Per wife, patient had a similar episode of GI bleeding 20 years ago and had part of his colon resected. Patient denies any sick contacts, new medication changes, new foods or recent travel. He denies fever, chest pain, SOB, nausea, vomiting, headache. He is a current tobacco smoker and reports smoking 3 cigarettes daily.      ER workup showed initial Hgb was 10.6 which dropped down to 8.0 after six hours. Patient was seen by the ER physician and admitted for further evaluation and treatment. Hemoglobin dropped to 6.8    5/23  Hgb still 6.8  WBC of 13.3  Patient not in room, away for colonoscopy.        Objective:     Visit Vitals  /68   Pulse 75   Temp 97.2 °F (36.2 °C)   Resp 16   Ht 5' 10\" (1.778 m)   Wt 137 lb (62.1 kg)   SpO2 96%   BMI 19.66 kg/m²        Recent Results (from the past 24 hour(s))   CBC WITH AUTOMATED DIFF    Collection Time: 05/22/22 12:47 PM   Result Value Ref Range    WBC 9.3 4.1 - 11.1 K/uL    RBC 2.22 (L) 4.10 - 5.70 M/uL    HGB 7.2 (L) 12.1 - 17.0 g/dL    HCT 21.0 (L) 36.6 - 50.3 %    MCV 94.6 80.0 - 99.0 FL    MCH 32.4 26.0 - 34.0 PG    MCHC 34.3 30.0 - 36.5 g/dL    RDW 14.3 11.5 - 14.5 %    PLATELET 045 517 - 216 K/uL    MPV 9.7 8.9 - 12.9 FL    NRBC 0.0 0.0  WBC    ABSOLUTE NRBC 0.00 0.00 - 0.01 K/uL    NEUTROPHILS 90 (H) 32 - 75 %    LYMPHOCYTES 7 (L) 12 - 49 %    MONOCYTES 2 (L) 5 - 13 %    EOSINOPHILS 0 0 - 7 %    BASOPHILS 0 0 - 1 %    IMMATURE GRANULOCYTES 1 (H) 0 - 0.5 %    ABS. NEUTROPHILS 8.5 (H) 1.8 - 8.0 K/UL    ABS. LYMPHOCYTES 0.6 (L) 0.8 - 3.5 K/UL    ABS. MONOCYTES 0.2 0.0 - 1.0 K/UL    ABS. EOSINOPHILS 0.0 0.0 - 0.4 K/UL    ABS. BASOPHILS 0.0 0.0 - 0.1 K/UL    ABS. IMM. GRANS. 0.1 (H) 0.00 - 0.04 K/UL    DF AUTOMATED     METABOLIC PANEL, BASIC    Collection Time: 05/22/22 12:47 PM   Result Value Ref Range    Sodium 139 136 - 145 mmol/L    Potassium 3.8 3.5 - 5.1 mmol/L    Chloride 110 (H) 97 - 108 mmol/L    CO2 20 (L) 21 - 32 mmol/L    Anion gap 9 5 - 15 mmol/L    Glucose 216 (H) 65 - 100 mg/dL    BUN 10 6 - 20 mg/dL    Creatinine 1.02 0.70 - 1.30 mg/dL    BUN/Creatinine ratio 10 (L) 12 - 20      GFR est AA >60 >60 ml/min/1.73m2    GFR est non-AA >60 >60 ml/min/1.73m2    Calcium 8.5 8.5 - 10.1 mg/dL   CBC W/O DIFF    Collection Time: 05/23/22  7:50 AM   Result Value Ref Range    WBC 13.3 (H) 4.1 - 11.1 K/uL    RBC 2.12 (L) 4.10 - 5.70 M/uL    HGB 6.8 (L) 12.1 - 17.0 g/dL    HCT 19.9 (L) 36.6 - 50.3 %    MCV 93.9 80.0 - 99.0 FL    MCH 32.1 26.0 - 34.0 PG    MCHC 34.2 30.0 - 36.5 g/dL    RDW 14.3 11.5 - 14.5 %    PLATELET 043 956 - 378 K/uL    MPV 9.7 8.9 - 12.9 FL    NRBC 0.0 0.0  WBC    ABSOLUTE NRBC 0.00 0.00 - 8.04 K/uL   METABOLIC PANEL, COMPREHENSIVE    Collection Time: 05/23/22  7:50 AM   Result Value Ref Range    Sodium 144 136 - 145 mmol/L    Potassium 3.6 3.5 - 5.1 mmol/L    Chloride 113 (H) 97 - 108 mmol/L    CO2 27 21 - 32 mmol/L    Anion gap 4 (L) 5 - 15 mmol/L    Glucose 129 (H) 65 - 100 mg/dL    BUN 11 6 - 20 mg/dL    Creatinine 0.60 (L) 0.70 - 1.30 mg/dL    BUN/Creatinine ratio 18 12 - 20      GFR est AA >60 >60 ml/min/1.73m2    GFR est non-AA >60 >60 ml/min/1.73m2    Calcium 8.3 (L) 8.5 - 10.1 mg/dL    Bilirubin, total 0.3 0.2 - 1.0 mg/dL    AST (SGOT) 51 (H) 15 - 37 U/L    ALT (SGPT) 24 12 - 78 U/L    Alk.  phosphatase 65 45 - 117 U/L    Protein, total 5.0 (L) 6.4 - 8.2 g/dL    Albumin 2.8 (L) 3.5 - 5.0 g/dL    Globulin 2.2 2.0 - 4.0 g/dL    A-G Ratio 1.3 1.1 - 2.2       [unfilled]      Review of Systems    Constitutional: Negative for chills and fever. HENT: Negative. Eyes: Negative. Respiratory: Negative. Cardiovascular: Negative. Gastrointestinal: Negative for abdominal pain and nausea. Skin: Negative. Neurological: Negative. Physical Exam:      Constitutional: pt is oriented to person, place, and time. HENT:   Head: Normocephalic and atraumatic. Eyes: Pupils are equal, round, and reactive to light. EOM are normal.   Cardiovascular: Normal rate, regular rhythm and normal heart sounds. Pulmonary/Chest: Breath sounds normal. No wheezes. No rales. Exhibits no tenderness. Abdominal: Soft. Bowel sounds are normal. There is no abdominal tenderness. There is no rebound and no guarding. Musculoskeletal: Normal range of motion. Neurological: pt is alert and oriented to person, place, and time. XR CHEST PORT   Final Result      2 portable AP views. No prior exams. Monitoring equipment overlies the body. Narrow heart. No mediastinal widening or shift. Hyperexpanded lungs consistent   with COPD. Right perihilar calcifications. No spiculated lung mass. Mild   scarring in the upper zones. Suspect calcified pleural plaques on the right. No consolidation. Negative for pulmonary edema, pleural effusion or pneumothorax. No acute fractures. Negative for subdiaphragmatic free air. CTA ABD PELV W WO CONT   Final Result   No evidence of active bleeding or demonstration of bleeding source.    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           Recent Results (from the past 24 hour(s))   CBC WITH AUTOMATED DIFF    Collection Time: 05/22/22 12:47 PM   Result Value Ref Range    WBC 9.3 4.1 - 11.1 K/uL    RBC 2.22 (L) 4.10 - 5.70 M/uL    HGB 7.2 (L) 12.1 - 17.0 g/dL    HCT 21.0 (L) 36.6 - 50.3 % MCV 94.6 80.0 - 99.0 FL    MCH 32.4 26.0 - 34.0 PG    MCHC 34.3 30.0 - 36.5 g/dL    RDW 14.3 11.5 - 14.5 %    PLATELET 903 668 - 477 K/uL    MPV 9.7 8.9 - 12.9 FL    NRBC 0.0 0.0  WBC    ABSOLUTE NRBC 0.00 0.00 - 0.01 K/uL    NEUTROPHILS 90 (H) 32 - 75 %    LYMPHOCYTES 7 (L) 12 - 49 %    MONOCYTES 2 (L) 5 - 13 %    EOSINOPHILS 0 0 - 7 %    BASOPHILS 0 0 - 1 %    IMMATURE GRANULOCYTES 1 (H) 0 - 0.5 %    ABS. NEUTROPHILS 8.5 (H) 1.8 - 8.0 K/UL    ABS. LYMPHOCYTES 0.6 (L) 0.8 - 3.5 K/UL    ABS. MONOCYTES 0.2 0.0 - 1.0 K/UL    ABS. EOSINOPHILS 0.0 0.0 - 0.4 K/UL    ABS. BASOPHILS 0.0 0.0 - 0.1 K/UL    ABS. IMM.  GRANS. 0.1 (H) 0.00 - 0.04 K/UL    DF AUTOMATED     METABOLIC PANEL, BASIC    Collection Time: 05/22/22 12:47 PM   Result Value Ref Range    Sodium 139 136 - 145 mmol/L    Potassium 3.8 3.5 - 5.1 mmol/L    Chloride 110 (H) 97 - 108 mmol/L    CO2 20 (L) 21 - 32 mmol/L    Anion gap 9 5 - 15 mmol/L    Glucose 216 (H) 65 - 100 mg/dL    BUN 10 6 - 20 mg/dL    Creatinine 1.02 0.70 - 1.30 mg/dL    BUN/Creatinine ratio 10 (L) 12 - 20      GFR est AA >60 >60 ml/min/1.73m2    GFR est non-AA >60 >60 ml/min/1.73m2    Calcium 8.5 8.5 - 10.1 mg/dL   CBC W/O DIFF    Collection Time: 05/23/22  7:50 AM   Result Value Ref Range    WBC 13.3 (H) 4.1 - 11.1 K/uL    RBC 2.12 (L) 4.10 - 5.70 M/uL    HGB 6.8 (L) 12.1 - 17.0 g/dL    HCT 19.9 (L) 36.6 - 50.3 %    MCV 93.9 80.0 - 99.0 FL    MCH 32.1 26.0 - 34.0 PG    MCHC 34.2 30.0 - 36.5 g/dL    RDW 14.3 11.5 - 14.5 %    PLATELET 521 884 - 292 K/uL    MPV 9.7 8.9 - 12.9 FL    NRBC 0.0 0.0  WBC    ABSOLUTE NRBC 0.00 0.00 - 7.25 K/uL   METABOLIC PANEL, COMPREHENSIVE    Collection Time: 05/23/22  7:50 AM   Result Value Ref Range    Sodium 144 136 - 145 mmol/L    Potassium 3.6 3.5 - 5.1 mmol/L    Chloride 113 (H) 97 - 108 mmol/L    CO2 27 21 - 32 mmol/L    Anion gap 4 (L) 5 - 15 mmol/L    Glucose 129 (H) 65 - 100 mg/dL    BUN 11 6 - 20 mg/dL    Creatinine 0.60 (L) 0.70 - 1.30 mg/dL    BUN/Creatinine ratio 18 12 - 20      GFR est AA >60 >60 ml/min/1.73m2    GFR est non-AA >60 >60 ml/min/1.73m2    Calcium 8.3 (L) 8.5 - 10.1 mg/dL    Bilirubin, total 0.3 0.2 - 1.0 mg/dL    AST (SGOT) 51 (H) 15 - 37 U/L    ALT (SGPT) 24 12 - 78 U/L    Alk. phosphatase 65 45 - 117 U/L    Protein, total 5.0 (L) 6.4 - 8.2 g/dL    Albumin 2.8 (L) 3.5 - 5.0 g/dL    Globulin 2.2 2.0 - 4.0 g/dL    A-G Ratio 1.3 1.1 - 2.2         Results     ** No results found for the last 336 hours. **           Labs:     Recent Labs     05/23/22  0750 05/22/22  1247   WBC 13.3* 9.3   HGB 6.8* 7.2*   HCT 19.9* 21.0*    192     Recent Labs     05/23/22  0750 05/22/22  1247 05/21/22  0654    139 140   K 3.6 3.8 4.1   * 110* 111*   CO2 27 20* 27   BUN 11 10 17   CREA 0.60* 1.02 0.67*   * 216* 88   CA 8.3* 8.5 8.0*     Recent Labs     05/23/22  0750 05/21/22  0654   ALT 24 17   AP 65 68   TBILI 0.3 0.4   TP 5.0* 4.9*   ALB 2.8* 2.7*   GLOB 2.2 2.2     No results for input(s): INR, PTP, APTT, INREXT, INREXT in the last 72 hours. No results for input(s): FE, TIBC, PSAT, FERR in the last 72 hours. No results found for: FOL, RBCF   No results for input(s): PH, PCO2, PO2 in the last 72 hours. No results for input(s): CPK, CKNDX, TROIQ in the last 72 hours. No lab exists for component: CPKMB  No results found for: CHOL, CHOLX, CHLST, CHOLV, HDL, HDLP, LDL, LDLC, DLDLP, TGLX, TRIGL, TRIGP, CHHD, CHHDX  No results found for: GLUCPOC  No results found for: COLOR, APPRN, SPGRU, REFSG, LASHELL, PROTU, GLUCU, KETU, BILU, UROU, PEDRO, LEUKU, GLUKE, EPSU, BACTU, WBCU, RBCU, CASTS, UCRY      Assessment:     Acute hypoxic respiratory failure 2 L  GI bleed   COPD  Tobacco use  Hyperlipidemia  Acute blood loss anemia      Plan:     Transfuse 1 unit packed RBC  Monitor H&H  Start on nebulizer treatment IV Solu-Medrol    Patient away for colonoscopy.      Current Facility-Administered Medications:     albuterol-ipratropium (DUO-NEB) 2.5 MG-0.5 MG/3 ML, 3 mL, Nebulization, Q6H PRN, Jena Johansen MD    0.9% sodium chloride infusion 250 mL, 250 mL, IntraVENous, PRN, Lata Daly MD    0.9% sodium chloride infusion 250 mL, 250 mL, IntraVENous, PRN, Maribell Johansen MD    budesonide-formoteroL [facility]) 160-4.5 mcg/actuation HFA inhaler 2 Puff, 2 Puff, Inhalation, BID, Jena Johansen MD, 2 Puff at 05/23/22 0752    methylPREDNISolone (PF) (SOLU-MEDROL) injection 40 mg, 40 mg, IntraVENous, Q6H, Jena Johansen MD, 40 mg at 05/23/22 0540    traMADoL (ULTRAM) tablet 50 mg, 50 mg, Oral, Q6H PRN, Jena Johansen MD, 50 mg at 05/21/22 1718    atorvastatin (LIPITOR) tablet 40 mg, 40 mg, Oral, DAILY, Jena Johansen MD, 40 mg at 05/22/22 0815    0.9% sodium chloride infusion, 75 mL/hr, IntraVENous, CONTINUOUS, Jena Johansen MD, Last Rate: 75 mL/hr at 05/23/22 0920, New Bag at 05/23/22 0920    acetaminophen (TYLENOL) tablet 650 mg, 650 mg, Oral, Q6H PRN, 650 mg at 05/21/22 0945 **OR** acetaminophen (TYLENOL) suppository 650 mg, 650 mg, Rectal, Q6H PRN, Maribell Johansen MD    polyethylene glycol (MIRALAX) packet 17 g, 17 g, Oral, DAILY PRN, Maribell Johansen MD    ondansetron (ZOFRAN ODT) tablet 4 mg, 4 mg, Oral, Q8H PRN **OR** ondansetron (ZOFRAN) injection 4 mg, 4 mg, IntraVENous, Q6H PRN, Jena Johansen MD    pantoprazole (PROTONIX) 40 mg in 0.9% sodium chloride 10 mL injection, 40 mg, IntraVENous, Q12H, Madalyn Remedies F, NP, 40 mg at 05/22/22 2127    sodium chloride (NS) flush 5-40 mL, 5-40 mL, IntraVENous, Q8H, Dougherty, Barb F, NP, 10 mL at 05/23/22 0540    sodium chloride (NS) flush 5-40 mL, 5-40 mL, IntraVENous, PRN, [employer], NP

## 2022-05-23 NOTE — PROGRESS NOTES
CM discussed discharge planning with patient and wife at bedside. Patient will return home self care. Wife will assist as needed and transport. Patient is a . CM discussed  transfer forms. Patient received, reviewed and signed [facility] Refusal to Transfer [facility]. Updated clinicals sent to [facility].

## 2022-05-24 VITALS
HEIGHT: 70 IN | TEMPERATURE: 97.9 F | BODY MASS INDEX: 19.61 KG/M2 | DIASTOLIC BLOOD PRESSURE: 70 MMHG | OXYGEN SATURATION: 96 % | RESPIRATION RATE: 16 BRPM | HEART RATE: 70 BPM | WEIGHT: 137 LBS | SYSTOLIC BLOOD PRESSURE: 112 MMHG

## 2022-05-24 LAB
ABO + RH BLD: NORMAL
ALBUMIN SERPL-MCNC: 3 G/DL (ref 3.5–5)
ALBUMIN/GLOB SERPL: 1.3 {RATIO} (ref 1.1–2.2)
ALP SERPL-CCNC: 81 U/L (ref 45–117)
ALT SERPL-CCNC: 47 U/L (ref 12–78)
ANION GAP SERPL CALC-SCNC: 3 MMOL/L (ref 5–15)
AST SERPL W P-5'-P-CCNC: 93 U/L (ref 15–37)
BILIRUB SERPL-MCNC: 0.6 MG/DL (ref 0.2–1)
BLD PROD TYP BPU: NORMAL
BLOOD GROUP ANTIBODIES SERPL: NEGATIVE
BPU ID: NORMAL
BUN SERPL-MCNC: 19 MG/DL (ref 6–20)
BUN/CREAT SERPL: 25 (ref 12–20)
CA-I BLD-MCNC: 8.4 MG/DL (ref 8.5–10.1)
CHLORIDE SERPL-SCNC: 110 MMOL/L (ref 97–108)
CO2 SERPL-SCNC: 27 MMOL/L (ref 21–32)
CREAT SERPL-MCNC: 0.75 MG/DL (ref 0.7–1.3)
CROSSMATCH RESULT,%XM: NORMAL
ERYTHROCYTE [DISTWIDTH] IN BLOOD BY AUTOMATED COUNT: 18.5 % (ref 11.5–14.5)
GLOBULIN SER CALC-MCNC: 2.4 G/DL (ref 2–4)
GLUCOSE SERPL-MCNC: 107 MG/DL (ref 65–100)
HCT VFR BLD AUTO: 29.3 % (ref 36.6–50.3)
HGB BLD-MCNC: 9.9 G/DL (ref 12.1–17)
MCH RBC QN AUTO: 30.6 PG (ref 26–34)
MCHC RBC AUTO-ENTMCNC: 33.8 G/DL (ref 30–36.5)
MCV RBC AUTO: 90.4 FL (ref 80–99)
NRBC # BLD: 0 K/UL (ref 0–0.01)
NRBC BLD-RTO: 0 PER 100 WBC
PLATELET # BLD AUTO: 222 K/UL (ref 150–400)
PMV BLD AUTO: 9.8 FL (ref 8.9–12.9)
POTASSIUM SERPL-SCNC: 4.2 MMOL/L (ref 3.5–5.1)
PROT SERPL-MCNC: 5.4 G/DL (ref 6.4–8.2)
RBC # BLD AUTO: 3.24 M/UL (ref 4.1–5.7)
SODIUM SERPL-SCNC: 140 MMOL/L (ref 136–145)
SPECIMEN EXP DATE BLD: NORMAL
STATUS OF UNIT,%ST: NORMAL
TRANSFUSION STATUS PATIENT QL: NORMAL
UNIT DIVISION, %UDIV: 0
WBC # BLD AUTO: 11.5 K/UL (ref 4.1–11.1)

## 2022-05-24 PROCEDURE — 36415 COLL VENOUS BLD VENIPUNCTURE: CPT

## 2022-05-24 PROCEDURE — 74011250636 HC RX REV CODE- 250/636: Performed by: FAMILY MEDICINE

## 2022-05-24 PROCEDURE — 94640 AIRWAY INHALATION TREATMENT: CPT

## 2022-05-24 PROCEDURE — 74011250636 HC RX REV CODE- 250/636: Performed by: NURSE PRACTITIONER

## 2022-05-24 PROCEDURE — 74011000250 HC RX REV CODE- 250: Performed by: NURSE PRACTITIONER

## 2022-05-24 PROCEDURE — 85027 COMPLETE CBC AUTOMATED: CPT

## 2022-05-24 PROCEDURE — 74011250637 HC RX REV CODE- 250/637: Performed by: FAMILY MEDICINE

## 2022-05-24 PROCEDURE — 80053 COMPREHEN METABOLIC PANEL: CPT

## 2022-05-24 PROCEDURE — C9113 INJ PANTOPRAZOLE SODIUM, VIA: HCPCS | Performed by: NURSE PRACTITIONER

## 2022-05-24 RX ORDER — BUDESONIDE AND FORMOTEROL FUMARATE DIHYDRATE 160; 4.5 UG/1; UG/1
2 AEROSOL RESPIRATORY (INHALATION) 2 TIMES DAILY
Qty: 10.2 G | Refills: 1 | Status: SHIPPED | OUTPATIENT
Start: 2022-05-24

## 2022-05-24 RX ORDER — IPRATROPIUM BROMIDE AND ALBUTEROL SULFATE 2.5; .5 MG/3ML; MG/3ML
3 SOLUTION RESPIRATORY (INHALATION)
Qty: 30 NEBULE | Refills: 1 | Status: SHIPPED | OUTPATIENT
Start: 2022-05-24

## 2022-05-24 RX ORDER — PANTOPRAZOLE SODIUM 40 MG/1
40 TABLET, DELAYED RELEASE ORAL DAILY
Qty: 30 TABLET | Refills: 0 | Status: SHIPPED | OUTPATIENT
Start: 2022-05-24

## 2022-05-24 RX ORDER — PREDNISONE 10 MG/1
TABLET ORAL
Qty: 20 TABLET | Refills: 0 | Status: SHIPPED | OUTPATIENT
Start: 2022-05-24

## 2022-05-24 RX ADMIN — BUDESONIDE AND FORMOTEROL FUMARATE DIHYDRATE 2 PUFF: 160; 4.5 AEROSOL RESPIRATORY (INHALATION) at 08:30

## 2022-05-24 RX ADMIN — SODIUM CHLORIDE, PRESERVATIVE FREE 10 ML: 5 INJECTION INTRAVENOUS at 05:56

## 2022-05-24 RX ADMIN — METHYLPREDNISOLONE SODIUM SUCCINATE 40 MG: 40 INJECTION, POWDER, FOR SOLUTION INTRAMUSCULAR; INTRAVENOUS at 14:01

## 2022-05-24 RX ADMIN — SODIUM CHLORIDE, PRESERVATIVE FREE 10 ML: 5 INJECTION INTRAVENOUS at 14:01

## 2022-05-24 RX ADMIN — SODIUM CHLORIDE, PRESERVATIVE FREE 40 MG: 5 INJECTION INTRAVENOUS at 08:52

## 2022-05-24 RX ADMIN — METHYLPREDNISOLONE SODIUM SUCCINATE 40 MG: 40 INJECTION, POWDER, FOR SOLUTION INTRAMUSCULAR; INTRAVENOUS at 00:42

## 2022-05-24 RX ADMIN — METHYLPREDNISOLONE SODIUM SUCCINATE 40 MG: 40 INJECTION, POWDER, FOR SOLUTION INTRAMUSCULAR; INTRAVENOUS at 05:56

## 2022-05-24 RX ADMIN — ATORVASTATIN CALCIUM 40 MG: 40 TABLET, FILM COATED ORAL at 08:52

## 2022-05-24 RX ADMIN — METHYLPREDNISOLONE SODIUM SUCCINATE 40 MG: 40 INJECTION, POWDER, FOR SOLUTION INTRAMUSCULAR; INTRAVENOUS at 00:44

## 2022-05-24 NOTE — DISCHARGE INSTRUCTIONS
Patient Education   PATIENT INSTRUCTIONS  DIVERTICULITIS    FOLLOW-UP:  Please make an appointment with your physician in {NUMBER:94178} {TIME PERIOD:9076}. Call your physician immediately if you have any fevers greater than 102.5,  increasing abdominal pain, or nausea/vomiting. CAUSE:  Some people may have congenital diverticulosis, but most people develop diverticulosis around or after age 36 due to a low-fiber, high-fat diet, along with inadequate fluid intake. This is very prevalent in the industrialized world where we eat a lot of processed, low fiber foods. Diverticuli develop due to firm stool and high pressures in the colon, along with secondary spasm, which causes outpouchings to occus where the small arteries penetrate the wall of the colon to feed the internal lining. These occur most commonly on the left side and lower portions of the colon. Diverticuli can then cause bleeding from the arteries at these sites of weakness when they rupture or the diveritculi can get blocked with stool and debris and become obstructed, causing diverticulosis, which is due to an infection. When these are infected, diverticulitis, it is often treated with antibiotics and bowel rest, but when severe, recurrent, or if rupture of the colon occurs, it may require surgery. Following appropriate dietary changes and taking the proper precautions is therefore very important. DIET:  You should increase your dietary fiber intake and take a fiber supplement twice a day. Make sure that you are taking a supplement that is just fiber and is not a laxative, which should be noted on the package. Starting a fiber supplement may cause increased gas, more frequent bowel movements, and distension at first but this should improve after a couple of weeks. Try to eat whole wheat breads and pasta, more fruits and vegetables, along with brown rice and plenty of fluids.   Avoid small undigestible food items that could get stuck in these outpouchings, such as unpopped popcorn kernals, whole corn, small undigestible seeds, etc..    ACTIVITY:  Exercise is also a great way to prevent constipation and is encouraged. Always make sure you take in plenty of fluids when exercising. MEDICATIONS:  Take an over-the-counter fiber supplement as noted above twice daily. If your symptoms don't improve with fiber and dietary changes alone your physician may also recommend psyllium or methylcellulose as well. If your physician has placed you on an antibiotic it is critical that you take the full course of these, even if your symptoms have improved, and that you not miss any doses. QUESTIONS:  Please feel free to call your physician or the hospital  if you have any questions, and they will be glad to assist you. If you have further questions it may also be helpful to meet with a dietitician. Diverticulitis: Care Instructions  Overview     Diverticulitis occurs when pouches form in the wall of the colon and become inflamed or infected. It can be very painful. Doctors aren't sure what causes diverticulitis. There is no proof that foods such as nuts, seeds, or berries cause it or make it worse. A low-fiber diet may cause the colon to work harder to push stool forward. Pouches may form because of this extra work. It may be hard to think about healthy eating while you're in pain. But as you recover, you might think about how you can use healthy eating for overall better health. Healthy eating may help you avoid future attacks. Follow-up care is a key part of your treatment and safety. Be sure to make and go to all appointments, and call your doctor if you are having problems. It's also a good idea to know your test results and keep a list of the medicines you take. How can you care for yourself at home? · Drink plenty of fluids.  If you have kidney, heart, or liver disease and have to limit fluids, talk with your doctor before you increase the amount of fluids you drink. · Stay with liquids or a bland diet (plain rice, bananas, dry toast or crackers, applesauce) until you are feeling better. Then you can return to regular foods and slowly increase the amount of fiber in your diet. · Use a heating pad set on low on your belly to relieve mild cramps and pain. · Get extra rest until you are feeling better. · Be safe with medicines. Read and follow all instructions on the label. ? If the doctor gave you a prescription medicine for pain, take it as prescribed. ? If you are not taking a prescription pain medicine, ask your doctor if you can take an over-the-counter medicine. · If your doctor prescribed antibiotics, take them as directed. Do not stop taking them just because you feel better. You need to take the full course of antibiotics. · Do not use laxatives or enemas unless your doctor tells you to use them. When should you call for help? Call your doctor now or seek immediate medical care if:    · You have a fever.     · You are vomiting.     · You have new or worse belly pain.     · You cannot pass stools or gas. Watch closely for changes in your health, and be sure to contact your doctor if you have any problems. Where can you learn more? Go to http://www.gray.com/  Enter H901 in the search box to learn more about \"Diverticulitis: Care Instructions. \"  Current as of: September 8, 2021               Content Version: 13.2  © 5101-9776 [employer]. Care instructions adapted under license by [employer] (which disclaims liability or warranty for this information). If you have questions about a medical condition or this instruction, always ask your healthcare professional. [place] [address] any warranty or liability for your use of this information.           Discharge Instructions       PATIENT ID: Ibis Fernando  MRN: [id number]   YOB: 1945    DATE OF ADMISSION: 5/19/2022 11:39 PM    DATE OF DISCHARGE: 5/24/2022    PRIMARY CARE PROVIDER: Axel Sanchez MD     ATTENDING PHYSICIAN: Jayla Glover MD  DISCHARGING PROVIDER: Marisol Norris MD    To contact this individual call [phone] and ask the  to page. If unavailable ask to be transferred the Adult Hospitalist Department. DISCHARGE DIAGNOSES bleed GI     CONSULTATIONS: IP CONSULT TO HOSPITALIST  IP CONSULT TO GASTROENTEROLOGY    PROCEDURES/SURGERIES: Procedure(s):  COLONOSCOPY    PENDING TEST RESULTS:   At the time of discharge the following test results are still pending: None    FOLLOW UP APPOINTMENTS:   Follow-up Information     Follow up With Specialties Details Why Nida Wan MD Family Medicine In 1 week  [address]  [phone]             ADDITIONAL CARE RECOMMENDATIONS: Follow-up with PCP/quit smoking    DIET: Cardiac Diet      ACTIVITY: Activity as tolerated    Wound care: Wound Care Order: submitted to [facility] Please view [email or web address]    EQUIPMENT needed: ***      DISCHARGE MEDICATIONS:   See Medication Reconciliation Form    · It is important that you take the medication exactly as they are prescribed. · Keep your medication in the bottles provided by the pharmacist and keep a list of the medication names, dosages, and times to be taken in your wallet. · Do not take other medications without consulting your doctor. NOTIFY YOUR PHYSICIAN FOR ANY OF THE FOLLOWING:   Fever over 101 degrees for 24 hours. Chest pain, shortness of breath, fever, chills, nausea, vomiting, diarrhea, change in mentation, falling, weakness, bleeding. Severe pain or pain not relieved by medications. Or, any other signs or symptoms that you may have questions about.       DISPOSITION:    Home With:   OT  PT  HH  RN       SNF/Inpatient Rehab/LTAC    Independent/assisted living    Hospice    Other:         PROBLEM LIST Updated:  Yes ***       Signed:   Dunia Whitlock MD  5/24/2022  5:01 PM

## 2022-05-24 NOTE — PROGRESS NOTES
General Daily Progress Note          Patient Name:   Temo Murray       YOB: 1945       Age:  78 y.o. Admit Date: 5/19/2022      Subjective:     Patient is a 78y.o. year old male with a PMHx significant for COPD, hypercholesteremia, tobacco use, prior lower GI bleed 20 years ago, who presented to the ED for rectal bleeding for the past day. Patient states that around 2pm yesterday he began having dark tarry stools which transitioned to bright red stools, he had 7 episodes of bloody stools yesterday. He has associated mid epigastric pain that is worse with movement and chills Per wife, patient had a similar episode of GI bleeding 20 years ago and had part of his colon resected. Patient denies any sick contacts, new medication changes, new foods or recent travel. He denies fever, chest pain, SOB, nausea, vomiting, headache. He is a current tobacco smoker and reports smoking 3 cigarettes daily.      ER workup showed initial Hgb was 10.6 which dropped down to 8.0 after six hours. Patient was seen by the ER physician and admitted for further evaluation and treatment. Hemoglobin dropped to 6.8    5/23  Hgb still 6.8  WBC of 13.3  Going for colonoscopy today    5/24  Patient is resting comfortably in bed, wife in room. He has no complaints and denies any chest pain, shortness of breath, abdominal pain, n/v/d, headache, lightheadedness. He passed a B yesterday which was loose but without blood. He was transfused 2 units of packed RBC. Colonoscopy results pending.    Labs: Hgb 9.9/ Hct 29.3  leukocytosis improving at 11.5      Objective:     Visit Vitals  /78 (BP 1 Location: Left upper arm)   Pulse 71   Temp 97.8 °F (36.6 °C)   Resp 16   Ht 5' 10\" (1.778 m)   Wt 137 lb (62.1 kg)   SpO2 97%   BMI 19.66 kg/m²        Recent Results (from the past 24 hour(s))   CBC W/O DIFF    Collection Time: 05/24/22  7:35 AM   Result Value Ref Range    WBC 11.5 (H) 4.1 - 11.1 K/uL    RBC 3.24 (L) 4.10 - 5.70 M/uL    HGB 9.9 (L) 12.1 - 17.0 g/dL    HCT 29.3 (L) 36.6 - 50.3 %    MCV 90.4 80.0 - 99.0 FL    MCH 30.6 26.0 - 34.0 PG    MCHC 33.8 30.0 - 36.5 g/dL    RDW 18.5 (H) 11.5 - 14.5 %    PLATELET 255 825 - 446 K/uL    MPV 9.8 8.9 - 12.9 FL    NRBC 0.0 0.0  WBC    ABSOLUTE NRBC 0.00 0.00 - 3.08 K/uL   METABOLIC PANEL, COMPREHENSIVE    Collection Time: 05/24/22  7:35 AM   Result Value Ref Range    Sodium 140 136 - 145 mmol/L    Potassium 4.2 3.5 - 5.1 mmol/L    Chloride 110 (H) 97 - 108 mmol/L    CO2 27 21 - 32 mmol/L    Anion gap 3 (L) 5 - 15 mmol/L    Glucose 107 (H) 65 - 100 mg/dL    BUN 19 6 - 20 mg/dL    Creatinine 0.75 0.70 - 1.30 mg/dL    BUN/Creatinine ratio 25 (H) 12 - 20      GFR est AA >60 >60 ml/min/1.73m2    GFR est non-AA >60 >60 ml/min/1.73m2    Calcium 8.4 (L) 8.5 - 10.1 mg/dL    Bilirubin, total 0.6 0.2 - 1.0 mg/dL    AST (SGOT) 93 (H) 15 - 37 U/L    ALT (SGPT) 47 12 - 78 U/L    Alk. phosphatase 81 45 - 117 U/L    Protein, total 5.4 (L) 6.4 - 8.2 g/dL    Albumin 3.0 (L) 3.5 - 5.0 g/dL    Globulin 2.4 2.0 - 4.0 g/dL    A-G Ratio 1.3 1.1 - 2.2       [unfilled]      Review of Systems    Constitutional: Negative for chills and fever. HENT: Negative. Eyes: Negative. Respiratory: Negative. Cardiovascular: Negative. Gastrointestinal: Negative for abdominal pain and nausea. Skin: Negative. Neurological: Negative. Physical Exam:      Constitutional: pt is oriented to person, place, and time. HENT:   Head: Normocephalic and atraumatic. Eyes: Pupils are equal, round, and reactive to light. EOM are normal.   Cardiovascular: Normal rate, regular rhythm and normal heart sounds. Pulmonary/Chest: Breath sounds normal. No wheezes. No rales. Exhibits no tenderness. Abdominal: Soft. Bowel sounds are normal. There is no abdominal tenderness. There is no rebound and no guarding. Musculoskeletal: Normal range of motion.    Neurological: pt is alert and oriented to person, place, and time. XR CHEST PORT   Final Result      2 portable AP views. No prior exams. Monitoring equipment overlies the body. Narrow heart. No mediastinal widening or shift. Hyperexpanded lungs consistent   with COPD. Right perihilar calcifications. No spiculated lung mass. Mild   scarring in the upper zones. Suspect calcified pleural plaques on the right. No consolidation. Negative for pulmonary edema, pleural effusion or pneumothorax. No acute fractures. Negative for subdiaphragmatic free air. CTA ABD PELV W WO CONT   Final Result   No evidence of active bleeding or demonstration of bleeding source. 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           Recent Results (from the past 24 hour(s))   CBC W/O DIFF    Collection Time: 05/24/22  7:35 AM   Result Value Ref Range    WBC 11.5 (H) 4.1 - 11.1 K/uL    RBC 3.24 (L) 4.10 - 5.70 M/uL    HGB 9.9 (L) 12.1 - 17.0 g/dL    HCT 29.3 (L) 36.6 - 50.3 %    MCV 90.4 80.0 - 99.0 FL    MCH 30.6 26.0 - 34.0 PG    MCHC 33.8 30.0 - 36.5 g/dL    RDW 18.5 (H) 11.5 - 14.5 %    PLATELET 539 577 - 112 K/uL    MPV 9.8 8.9 - 12.9 FL    NRBC 0.0 0.0  WBC    ABSOLUTE NRBC 0.00 0.00 - 6.74 K/uL   METABOLIC PANEL, COMPREHENSIVE    Collection Time: 05/24/22  7:35 AM   Result Value Ref Range    Sodium 140 136 - 145 mmol/L    Potassium 4.2 3.5 - 5.1 mmol/L    Chloride 110 (H) 97 - 108 mmol/L    CO2 27 21 - 32 mmol/L    Anion gap 3 (L) 5 - 15 mmol/L    Glucose 107 (H) 65 - 100 mg/dL    BUN 19 6 - 20 mg/dL    Creatinine 0.75 0.70 - 1.30 mg/dL    BUN/Creatinine ratio 25 (H) 12 - 20      GFR est AA >60 >60 ml/min/1.73m2    GFR est non-AA >60 >60 ml/min/1.73m2    Calcium 8.4 (L) 8.5 - 10.1 mg/dL    Bilirubin, total 0.6 0.2 - 1.0 mg/dL    AST (SGOT) 93 (H) 15 - 37 U/L    ALT (SGPT) 47 12 - 78 U/L    Alk.  phosphatase 81 45 - 117 U/L    Protein, total 5.4 (L) 6.4 - 8.2 g/dL    Albumin 3.0 (L) 3.5 - 5.0 g/dL    Globulin 2.4 2.0 - 4.0 g/dL A-G Ratio 1.3 1.1 - 2.2         Results     ** No results found for the last 336 hours. **           Labs:     Recent Labs     05/24/22  0735 05/23/22  0750   WBC 11.5* 13.3*   HGB 9.9* 6.8*   HCT 29.3* 19.9*    207     Recent Labs     05/24/22  0735 05/23/22  0750 05/22/22  1247    144 139   K 4.2 3.6 3.8   * 113* 110*   CO2 27 27 20*   BUN 19 11 10   CREA 0.75 0.60* 1.02   * 129* 216*   CA 8.4* 8.3* 8.5     Recent Labs     05/24/22  0735 05/23/22  0750   ALT 47 24   AP 81 65   TBILI 0.6 0.3   TP 5.4* 5.0*   ALB 3.0* 2.8*   GLOB 2.4 2.2     No results for input(s): INR, PTP, APTT, INREXT, INREXT in the last 72 hours. No results for input(s): FE, TIBC, PSAT, FERR in the last 72 hours. No results found for: FOL, RBCF   No results for input(s): PH, PCO2, PO2 in the last 72 hours. No results for input(s): CPK, CKNDX, TROIQ in the last 72 hours.     No lab exists for component: CPKMB  No results found for: CHOL, CHOLX, CHLST, CHOLV, HDL, HDLP, LDL, LDLC, DLDLP, TGLX, TRIGL, TRIGP, CHHD, CHHDX  No results found for: GLUCPOC  No results found for: COLOR, APPRN, SPGRU, REFSG, LASHELL, PROTU, GLUCU, KETU, BILU, UROU, PEDRO, LEUKU, GLUKE, EPSU, BACTU, WBCU, RBCU, CASTS, UCRY      Assessment:     Acute hypoxic respiratory failure 2 L  GI bleed   COPD  Tobacco use  Hyperlipidemia  Acute blood loss anemia      Plan:     Continue nebulizer treatment IV Solu-Medrol  Colonoscopy results pending      Current Facility-Administered Medications:     albuterol-ipratropium (DUO-NEB) 2.5 MG-0.5 MG/3 ML, 3 mL, Nebulization, Q6H PRN, Jena Johansen MD    0.9% sodium chloride infusion 250 mL, 250 mL, IntraVENous, PRN, Porsha Chase MD    0.9% sodium chloride infusion 250 mL, 250 mL, IntraVENous, PRN, Jena Johansen MD    0.9% sodium chloride infusion 250 mL, 250 mL, IntraVENous, PRN, Suzanne Johansen MD    budesonide-formoteroL (SYMBICORT) 160-4.5 mcg/actuation HFA inhaler 2 Puff, 2 Puff, Inhalation, BID, Marie Price MD, 2 Puff at 05/24/22 0830    methylPREDNISolone (PF) (SOLU-MEDROL) injection 40 mg, 40 mg, IntraVENous, Q6H, Jena Johansen MD, 40 mg at 05/24/22 0556    traMADoL (ULTRAM) tablet 50 mg, 50 mg, Oral, Q6H PRN, Jena Johansen MD, 50 mg at 05/21/22 1718    atorvastatin (LIPITOR) tablet 40 mg, 40 mg, Oral, DAILY, Jena Johansen MD, 40 mg at 05/24/22 2459    0.9% sodium chloride infusion, 75 mL/hr, IntraVENous, CONTINUOUS, Jena Johansen MD, Last Rate: 75 mL/hr at 05/23/22 2110, 75 mL/hr at 05/23/22 2110    acetaminophen (TYLENOL) tablet 650 mg, 650 mg, Oral, Q6H PRN, 650 mg at 05/21/22 0945 **OR** acetaminophen (TYLENOL) suppository 650 mg, 650 mg, Rectal, Q6H PRN, Aye Johansen MD    polyethylene glycol (MIRALAX) packet 17 g, 17 g, Oral, DAILY PRN, Aye Johansen MD    ondansetron (ZOFRAN ODT) tablet 4 mg, 4 mg, Oral, Q8H PRN **OR** ondansetron (ZOFRAN) injection 4 mg, 4 mg, IntraVENous, Q6H PRN, Jena Johansen MD    pantoprazole (PROTONIX) 40 mg in 0.9% sodium chloride 10 mL injection, 40 mg, IntraVENous, Q12H, Maia Moser F, NP, 40 mg at 05/24/22 0852    sodium chloride (NS) flush 5-40 mL, 5-40 mL, IntraVENous, Q8H, Barb Dougherty F, NP, 10 mL at 05/24/22 0556    sodium chloride (NS) flush 5-40 mL, 5-40 mL, IntraVENous, PRN, Lucho Payan, NP

## 2022-05-24 NOTE — DISCHARGE SUMMARY
Discharge Summary       PATIENT ID: Jacob Miller  MRN: [id number]   YOB: 1945    DATE OF ADMISSION: 5/19/2022 11:39 PM    DATE OF DISCHARGE:   PRIMARY CARE PROVIDER: Axel Sanchez MD     ATTENDING PHYSICIAN: Darrick Johansen  DISCHARGING PROVIDER: Darrick Johansen      CONSULTATIONS: IP CONSULT TO HOSPITALIST  IP CONSULT TO GASTROENTEROLOGY    PROCEDURES/SURGERIES: Procedure(s):  COLONOSCOPY    ADMITTING DIAGNOSES:    Patient Active Problem List    Diagnosis Date Noted    Upper GI bleed 05/20/2022    GI bleed 05/20/2022       DISCHARGE DIAGNOSES / PLAN:      Acute hypoxic respiratory failure resolve   GI bleed   COPD  Tobacco use  Hyperlipidemia  Acute blood loss anemia:   Colonoscopy done shows diverticulosis of the large and this time EGD as an outpatient        DISCHARGE MEDICATIONS:  Current Discharge Medication List      START taking these medications    Details   albuterol-ipratropium (DUO-NEB) 2.5 mg-0.5 mg/3 ml nebu 3 mL by Nebulization route every six (6) hours as needed for Wheezing. Qty: 30 Nebule, Refills: 1  Start date: 5/24/2022      budesonide-formoteroL (SYMBICORT) 160-4.5 mcg/actuation HFAA Take 2 Puffs by inhalation two (2) times a day. Qty: 10.2 g, Refills: 1  Start date: 5/24/2022      pantoprazole (Protonix) 40 mg tablet Take 1 Tablet by mouth daily. Qty: 30 Tablet, Refills: 0  Start date: 5/24/2022      predniSONE (DELTASONE) 10 mg tablet 2 tablets daily for 10 days  Qty: 20 Tablet, Refills: 0  Start date: 5/24/2022         CONTINUE these medications which have NOT CHANGED    Details   atorvastatin (LIPITOR) 80 mg tablet Take 40 mg by mouth daily. NOTIFY YOUR PHYSICIAN FOR ANY OF THE FOLLOWING:   Fever over 101 degrees for 24 hours. Chest pain, shortness of breath, fever, chills, nausea, vomiting, diarrhea, change in mentation, falling, weakness, bleeding. Severe pain or pain not relieved by medications.   Or, any other signs or symptoms that you may have questions about. DISPOSITION:  x  Home With:   OT  PT  HH  RN       Long term SNF/Inpatient Rehab    Independent/assisted living    Hospice    Other:       PATIENT CONDITION AT DISCHARGE: Stable      PHYSICAL EXAMINATION AT DISCHARGE:  General:          Alert, cooperative, no distress, appears stated age. HEENT:           Atraumatic, anicteric sclerae, pink conjunctivae                          No oral ulcers, mucosa moist, throat clear, dentition fair  Neck:               Supple, symmetrical  Lungs:             Clear to auscultation bilaterally. No Wheezing or Rhonchi. No rales. Chest wall:      No tenderness  No Accessory muscle use. Heart:              Regular  rhythm,  No  murmur   No edema  Abdomen:        Soft, non-tender. Not distended. Bowel sounds normal  Extremities:     No cyanosis. No clubbing,                            Skin turgor normal, Capillary refill normal  Skin:                Not pale. Not Jaundiced  No rashes   Psych:             Not anxious or agitated. Neurologic:      Alert, moves all extremities, answers questions appropriately and responds to commands     XR CHEST PORT   Final Result      2 portable AP views. No prior exams. Monitoring equipment overlies the body. Narrow heart. No mediastinal widening or shift. Hyperexpanded lungs consistent   with COPD. Right perihilar calcifications. No spiculated lung mass. Mild   scarring in the upper zones. Suspect calcified pleural plaques on the right. No consolidation. Negative for pulmonary edema, pleural effusion or pneumothorax. No acute fractures. Negative for subdiaphragmatic free air. CTA ABD PELV W WO CONT   Final Result   No evidence of active bleeding or demonstration of bleeding source.    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           Recent Results (from the past 24 hour(s))   CBC W/O DIFF    Collection Time: 05/24/22  7:35 AM   Result Value Ref Range    WBC 11.5 (H) 4.1 - 11.1 K/uL    RBC 3.24 (L) 4.10 - 5.70 M/uL    HGB 9.9 (L) 12.1 - 17.0 g/dL    HCT 29.3 (L) 36.6 - 50.3 %    MCV 90.4 80.0 - 99.0 FL    MCH 30.6 26.0 - 34.0 PG    MCHC 33.8 30.0 - 36.5 g/dL    RDW 18.5 (H) 11.5 - 14.5 %    PLATELET 672 853 - 682 K/uL    MPV 9.8 8.9 - 12.9 FL    NRBC 0.0 0.0  WBC    ABSOLUTE NRBC 0.00 0.00 - 6.41 K/uL   METABOLIC PANEL, COMPREHENSIVE    Collection Time: 05/24/22  7:35 AM   Result Value Ref Range    Sodium 140 136 - 145 mmol/L    Potassium 4.2 3.5 - 5.1 mmol/L    Chloride 110 (H) 97 - 108 mmol/L    CO2 27 21 - 32 mmol/L    Anion gap 3 (L) 5 - 15 mmol/L    Glucose 107 (H) 65 - 100 mg/dL    BUN 19 6 - 20 mg/dL    Creatinine 0.75 0.70 - 1.30 mg/dL    BUN/Creatinine ratio 25 (H) 12 - 20      GFR est AA >60 >60 ml/min/1.73m2    GFR est non-AA >60 >60 ml/min/1.73m2    Calcium 8.4 (L) 8.5 - 10.1 mg/dL    Bilirubin, total 0.6 0.2 - 1.0 mg/dL    AST (SGOT) 93 (H) 15 - 37 U/L    ALT (SGPT) 47 12 - 78 U/L    Alk. phosphatase 81 45 - 117 U/L    Protein, total 5.4 (L) 6.4 - 8.2 g/dL    Albumin 3.0 (L) 3.5 - 5.0 g/dL    Globulin 2.4 2.0 - 4.0 g/dL    A-G Ratio 1.3 1.1 - 2.2            HOSPITAL COURSE:    Patient is a 69 y. o. year old male with a PMHx significant for COPD, hypercholesteremia, tobacco use, prior lower GI bleed 20 years ago, who presented to the ED for rectal bleeding for the past day. Patient states that around 2pm yesterday he began having dark tarry stools which transitioned to bright red stools, he had 7 episodes of bloody stools yesterday. He has associated mid epigastric pain that is worse with movement and chills Per wife, patient had a similar episode of GI bleeding 20 years ago and had part of his colon resected. Patient denies any sick contacts, new medication changes, new foods or recent travel. He denies fever, chest pain, SOB, nausea, vomiting, headache.  He is a current tobacco smoker and reports smoking 3 cigarettes daily.      ER workup showed initial Hgb was 10.6 which dropped down to 8.0 after six hours. Patient was seen by the ER physician and admitted for further evaluation and treatment.      Hemoglobin dropped to 6.8        Patient is resting comfortably in bed, wife in room. He has no complaints and denies any chest pain, shortness of breath, abdominal pain, n/v/d, headache, lightheadedness. He passed a B yesterday which was loose but without blood.    He was transfused 2 units of packed RBC    Colonoscopy done shows diverticulosis no active bleeding  Endoscopy as an outpatient hemoglobin stable    Breathing much better discussed with the patient regarding quit smoking    Medication reconciliation done time discharge patient 35 minutes 50% time spent in counseling and coordination of care      Signed:   Katja Reeves MD  5/24/2022  5:02 PM

## 2022-05-25 NOTE — PROGRESS NOTES
Physician Progress Note      Dago Roblero  [facility] #:                  [id number]  :                       1945  ADMIT DATE:       2022 11:39 PM  [address] [place] DATE:        2022 8:00 PM  RESPONDING  PROVIDER #:        Amarjit Rosas MD          QUERY TEXT:    Pt admitted with GIB. Pt noted to have a decrease in SCr to greater than or equal to 1.5 times baseline (60). If possible, please document in the progress notes and discharge summary if you are evaluating and/or treating any of the following: The medical record reflects the following:  Risk Factors: GI Bleed, COPD, Anemia, Tobacco use, HLD  Clinical Indicators:  Cr 1.10 -->  Cr 0.67  Treatment: GI Consulted, IV fluids, Blood transfusion, Lab monitoring. Defined by Kidney Disease Improving Global Outcomes (KDIGO) clinical practice guideline for acute kidney injury:  -Increase in SCr by greater than or equal to 0.3 mg/dl within 48?hours; or  -Increase or decrease in SCr to greater than or equal to 1.5 times baseline, which is known or presumed to have occurred within the prior 7 days; or  -Urine volume < 0.5ml/kg/h for 6 hours  Options provided:  -- Acute kidney injury  -- Acute kidney failure  -- Acute kidney failure with acute tubular necrosis  -- Other - I will add my own diagnosis  -- Disagree - Not applicable / Not valid  -- Disagree - Clinically unable to determine / Unknown  -- Refer to Clinical Documentation Reviewer    PROVIDER RESPONSE TEXT:    This patient has an Acute kidney injury.     Query created by: Hemanth Lazaro on 2022 1:37 PM      Electronically signed by:  Amarjit Rosas MD 2022 4:28 PM

## 2022-06-03 NOTE — PROGRESS NOTES
Physician Progress Note      PATIENTLorain Done  [facility] #:                  [id number]  :                       1945  ADMIT DATE:       2022 11:39 PM  [address] [place] DATE:        2022 8:00 PM  RESPONDING  PROVIDER #:        Gissel Ku MD          QUERY TEXT:    Patient admitted with GIB. Noted documentation of acute respiratory failure in DC Summary. In order to support the diagnosis of acute respiratory failure, please include additional clinical indicators in your documentation. Or please document if the diagnosis of acute respiratory failure has been ruled out after further study. The medical record reflects the following:  Risk Factors: Diverticulosis with bleeding, Posthemorrhagic anemia, SUZI, HLD, COPD  Clinical Indicators: DC Summary: \"Acute hypoxic respiratory failure resolve. \" ED Dr: Dionne Deng" complaining of some worsening shortness of breath on exertion. \" H&P: \" He denies fever, chest pain, SOB. \" \"Pulmonary/Chest: Breath sounds normal. No wheezes. No rales. \" O2 sats 95-97% on RA. Treatment: Oxygen supplement at 2L/NC, pulse ox monitoring, CXR, Lab monitoring. Acute Respiratory Failure Clinical Indicators per [employer] Training Guide and Quick Reference Guide:  pO2 < 60 mmHg or SpO2 (pulse oximetry) < 91% breathing room air  pCO2 > 50 and pH < 7.35  P/F ratio (pO2 / FIO2) < 300  pO2 decrease or pCO2 increase by 10 mmHg from baseline (if known)  Supplemental oxygen of 40% or more  Presence of respiratory distress, tachypnea, dyspnea, shortness of breath, wheezing  Unable to speak in complete sentences  Use of accessory muscles to breathe  Extreme anxiety and feeling of impending doom  Tripod position  Confusion/altered mental status/obtunded    Thank you,  Ari Kunz, NEELAMN, RN, CRCR, [address] [place] [address] Specialist  [email or web address] or [phone]  Options provided:  -- Acute Respiratory Failure as evidenced by, Please document evidence.   -- Acute Respiratory Failure ruled out after study  -- Other - I will add my own diagnosis  -- Disagree - Not applicable / Not valid  -- Disagree - Clinically unable to determine / Unknown  -- Refer to Clinical Documentation Reviewer    PROVIDER RESPONSE TEXT:    This patient is in acute respiratory failure as evidenced by hypoxia    Query created by: Cholo Harris on 5/31/2022 6:42 PM      Electronically signed by:  Evie Larkin MD 6/3/2022 11:05 AM

## (undated) DEVICE — THE ENDO CARRY-ON PROCEDURE KIT CONTAINS ALL OF THE SUPPLIES AND INFECTION PREVENTION PRODUCTS NEEDED FOR ENDOSCOPIC PROCEDURES: Brand: ENDO CARRY-ON PROCEDURE KIT

## (undated) DEVICE — MASK ANES INF SZ 2 PREM TAIL VLV INFL PRT UNSCENTED SGL PT